# Patient Record
Sex: MALE | Race: WHITE | NOT HISPANIC OR LATINO
[De-identification: names, ages, dates, MRNs, and addresses within clinical notes are randomized per-mention and may not be internally consistent; named-entity substitution may affect disease eponyms.]

---

## 2017-01-09 ENCOUNTER — MEDICATION RENEWAL (OUTPATIENT)
Age: 62
End: 2017-01-09

## 2017-01-31 ENCOUNTER — APPOINTMENT (OUTPATIENT)
Dept: FAMILY MEDICINE | Facility: CLINIC | Age: 62
End: 2017-01-31

## 2017-01-31 VITALS
DIASTOLIC BLOOD PRESSURE: 80 MMHG | TEMPERATURE: 98.6 F | HEART RATE: 74 BPM | SYSTOLIC BLOOD PRESSURE: 130 MMHG | RESPIRATION RATE: 18 BRPM

## 2017-01-31 DIAGNOSIS — G47.30 SLEEP APNEA, UNSPECIFIED: ICD-10-CM

## 2017-03-06 ENCOUNTER — MEDICATION RENEWAL (OUTPATIENT)
Age: 62
End: 2017-03-06

## 2017-04-25 ENCOUNTER — APPOINTMENT (OUTPATIENT)
Dept: FAMILY MEDICINE | Facility: CLINIC | Age: 62
End: 2017-04-25

## 2017-04-25 VITALS
SYSTOLIC BLOOD PRESSURE: 132 MMHG | HEART RATE: 74 BPM | DIASTOLIC BLOOD PRESSURE: 78 MMHG | TEMPERATURE: 98.4 F | RESPIRATION RATE: 18 BRPM

## 2017-04-30 LAB
ALBUMIN SERPL ELPH-MCNC: 4.8 G/DL
ALP BLD-CCNC: 78 U/L
ALT SERPL-CCNC: 27 U/L
ANION GAP SERPL CALC-SCNC: 21 MMOL/L
AST SERPL-CCNC: 24 U/L
BASOPHILS # BLD AUTO: 0.02 K/UL
BASOPHILS NFR BLD AUTO: 0.4 %
BILIRUB SERPL-MCNC: 0.5 MG/DL
BUN SERPL-MCNC: 13 MG/DL
CALCIUM SERPL-MCNC: 9.1 MG/DL
CHLORIDE SERPL-SCNC: 100 MMOL/L
CHOLEST SERPL-MCNC: 177 MG/DL
CHOLEST/HDLC SERPL: 6.3 RATIO
CO2 SERPL-SCNC: 17 MMOL/L
CREAT SERPL-MCNC: 0.72 MG/DL
EOSINOPHIL # BLD AUTO: 0.15 K/UL
EOSINOPHIL NFR BLD AUTO: 2.8 %
GLUCOSE SERPL-MCNC: 94 MG/DL
HCT VFR BLD CALC: 44.2 %
HDLC SERPL-MCNC: 28 MG/DL
HGB BLD-MCNC: 14.9 G/DL
IMM GRANULOCYTES NFR BLD AUTO: 0.2 %
LDLC SERPL CALC-MCNC: 123 MG/DL
LYMPHOCYTES # BLD AUTO: 2.17 K/UL
LYMPHOCYTES NFR BLD AUTO: 40.2 %
MAN DIFF?: NORMAL
MCHC RBC-ENTMCNC: 30.4 PG
MCHC RBC-ENTMCNC: 33.7 GM/DL
MCV RBC AUTO: 90.2 FL
MONOCYTES # BLD AUTO: 0.61 K/UL
MONOCYTES NFR BLD AUTO: 11.3 %
NEUTROPHILS # BLD AUTO: 2.44 K/UL
NEUTROPHILS NFR BLD AUTO: 45.1 %
PLATELET # BLD AUTO: 249 K/UL
POTASSIUM SERPL-SCNC: 4.5 MMOL/L
PROT SERPL-MCNC: 7.1 G/DL
PSA SERPL-MCNC: 2.46 NG/ML
RBC # BLD: 4.9 M/UL
RBC # FLD: 12.8 %
SODIUM SERPL-SCNC: 138 MMOL/L
TRIGL SERPL-MCNC: 129 MG/DL
TSH SERPL-ACNC: 2.07 UIU/ML
WBC # FLD AUTO: 5.4 K/UL

## 2017-07-05 ENCOUNTER — RX RENEWAL (OUTPATIENT)
Age: 62
End: 2017-07-05

## 2017-07-31 ENCOUNTER — RX RENEWAL (OUTPATIENT)
Age: 62
End: 2017-07-31

## 2017-08-17 ENCOUNTER — APPOINTMENT (OUTPATIENT)
Dept: FAMILY MEDICINE | Facility: CLINIC | Age: 62
End: 2017-08-17
Payer: MEDICAID

## 2017-08-17 VITALS
RESPIRATION RATE: 20 BRPM | HEART RATE: 76 BPM | DIASTOLIC BLOOD PRESSURE: 80 MMHG | BODY MASS INDEX: 28.49 KG/M2 | SYSTOLIC BLOOD PRESSURE: 126 MMHG | WEIGHT: 199 LBS | HEIGHT: 70 IN

## 2017-08-17 PROCEDURE — 99214 OFFICE O/P EST MOD 30 MIN: CPT | Mod: 25

## 2017-08-17 PROCEDURE — 36415 COLL VENOUS BLD VENIPUNCTURE: CPT

## 2017-08-20 LAB
ALBUMIN SERPL ELPH-MCNC: 4.9 G/DL
ALP BLD-CCNC: 78 U/L
ALT SERPL-CCNC: 27 U/L
ANION GAP SERPL CALC-SCNC: 17 MMOL/L
AST SERPL-CCNC: 20 U/L
BASOPHILS # BLD AUTO: 0.01 K/UL
BASOPHILS NFR BLD AUTO: 0.2 %
BILIRUB SERPL-MCNC: 0.5 MG/DL
BUN SERPL-MCNC: 15 MG/DL
CALCIUM SERPL-MCNC: 9.3 MG/DL
CHLORIDE SERPL-SCNC: 102 MMOL/L
CHOLEST SERPL-MCNC: 175 MG/DL
CHOLEST/HDLC SERPL: 6.5 RATIO
CO2 SERPL-SCNC: 21 MMOL/L
CREAT SERPL-MCNC: 0.76 MG/DL
EOSINOPHIL # BLD AUTO: 0.15 K/UL
EOSINOPHIL NFR BLD AUTO: 2.7 %
GLUCOSE SERPL-MCNC: 96 MG/DL
HBA1C MFR BLD HPLC: 5.6 %
HCT VFR BLD CALC: 44.8 %
HDLC SERPL-MCNC: 27 MG/DL
HGB BLD-MCNC: 14.9 G/DL
IMM GRANULOCYTES NFR BLD AUTO: 0.2 %
LDLC SERPL CALC-MCNC: 113 MG/DL
LYMPHOCYTES # BLD AUTO: 2.29 K/UL
LYMPHOCYTES NFR BLD AUTO: 40.9 %
MAN DIFF?: NORMAL
MCHC RBC-ENTMCNC: 30.6 PG
MCHC RBC-ENTMCNC: 33.3 GM/DL
MCV RBC AUTO: 92 FL
MONOCYTES # BLD AUTO: 0.59 K/UL
MONOCYTES NFR BLD AUTO: 10.5 %
NEUTROPHILS # BLD AUTO: 2.55 K/UL
NEUTROPHILS NFR BLD AUTO: 45.5 %
PLATELET # BLD AUTO: 227 K/UL
POTASSIUM SERPL-SCNC: 4.7 MMOL/L
PROT SERPL-MCNC: 7.3 G/DL
RBC # BLD: 4.87 M/UL
RBC # FLD: 12.8 %
SODIUM SERPL-SCNC: 140 MMOL/L
T4 FREE SERPL-MCNC: 1.3 NG/DL
TRIGL SERPL-MCNC: 173 MG/DL
TSH SERPL-ACNC: 1.95 UIU/ML
WBC # FLD AUTO: 5.6 K/UL

## 2017-08-28 ENCOUNTER — RX RENEWAL (OUTPATIENT)
Age: 62
End: 2017-08-28

## 2017-09-12 ENCOUNTER — MEDICATION RENEWAL (OUTPATIENT)
Age: 62
End: 2017-09-12

## 2017-09-12 RX ORDER — OMEPRAZOLE 20 MG/1
20 CAPSULE, DELAYED RELEASE ORAL DAILY
Qty: 30 | Refills: 3 | Status: COMPLETED | COMMUNITY
Start: 2017-09-12 | End: 2017-09-12

## 2017-09-14 ENCOUNTER — RX RENEWAL (OUTPATIENT)
Age: 62
End: 2017-09-14

## 2017-09-28 ENCOUNTER — RX RENEWAL (OUTPATIENT)
Age: 62
End: 2017-09-28

## 2017-10-10 ENCOUNTER — RX RENEWAL (OUTPATIENT)
Age: 62
End: 2017-10-10

## 2017-10-20 ENCOUNTER — APPOINTMENT (OUTPATIENT)
Dept: FAMILY MEDICINE | Facility: CLINIC | Age: 62
End: 2017-10-20
Payer: MEDICAID

## 2017-10-20 VITALS
BODY MASS INDEX: 28.63 KG/M2 | HEART RATE: 76 BPM | HEIGHT: 70 IN | RESPIRATION RATE: 20 BRPM | WEIGHT: 200 LBS | DIASTOLIC BLOOD PRESSURE: 70 MMHG | SYSTOLIC BLOOD PRESSURE: 125 MMHG

## 2017-10-20 PROCEDURE — 99213 OFFICE O/P EST LOW 20 MIN: CPT

## 2017-11-13 ENCOUNTER — RX RENEWAL (OUTPATIENT)
Age: 62
End: 2017-11-13

## 2017-11-24 ENCOUNTER — APPOINTMENT (OUTPATIENT)
Dept: FAMILY MEDICINE | Facility: CLINIC | Age: 62
End: 2017-11-24

## 2017-11-27 ENCOUNTER — RX RENEWAL (OUTPATIENT)
Age: 62
End: 2017-11-27

## 2017-11-30 ENCOUNTER — APPOINTMENT (OUTPATIENT)
Dept: FAMILY MEDICINE | Facility: CLINIC | Age: 62
End: 2017-11-30

## 2017-12-07 ENCOUNTER — APPOINTMENT (OUTPATIENT)
Dept: FAMILY MEDICINE | Facility: CLINIC | Age: 62
End: 2017-12-07
Payer: MEDICAID

## 2017-12-07 ENCOUNTER — LABORATORY RESULT (OUTPATIENT)
Age: 62
End: 2017-12-07

## 2017-12-07 VITALS
HEIGHT: 70 IN | DIASTOLIC BLOOD PRESSURE: 70 MMHG | WEIGHT: 204 LBS | BODY MASS INDEX: 29.2 KG/M2 | SYSTOLIC BLOOD PRESSURE: 124 MMHG | RESPIRATION RATE: 20 BRPM | HEART RATE: 78 BPM

## 2017-12-07 PROCEDURE — 99213 OFFICE O/P EST LOW 20 MIN: CPT | Mod: 25

## 2017-12-07 PROCEDURE — 36415 COLL VENOUS BLD VENIPUNCTURE: CPT

## 2017-12-20 LAB
ALBUMIN SERPL ELPH-MCNC: 4.9 G/DL
ALP BLD-CCNC: 78 U/L
ALT SERPL-CCNC: 31 U/L
ANION GAP SERPL CALC-SCNC: 15 MMOL/L
AST SERPL-CCNC: 22 U/L
BASOPHILS # BLD AUTO: 0.02 K/UL
BASOPHILS NFR BLD AUTO: 0.4 %
BILIRUB SERPL-MCNC: 0.5 MG/DL
BUN SERPL-MCNC: 12 MG/DL
CALCIUM SERPL-MCNC: 9.1 MG/DL
CHLORIDE SERPL-SCNC: 100 MMOL/L
CHOLEST SERPL-MCNC: 174 MG/DL
CHOLEST/HDLC SERPL: 6.4 RATIO
CO2 SERPL-SCNC: 24 MMOL/L
CREAT SERPL-MCNC: 0.72 MG/DL
EOSINOPHIL # BLD AUTO: 0.15 K/UL
EOSINOPHIL NFR BLD AUTO: 2.7 %
GLUCOSE SERPL-MCNC: 92 MG/DL
HBA1C MFR BLD HPLC: 5.4 %
HCT VFR BLD CALC: 44.3 %
HCV AB SER QL: ABNORMAL
HCV S/CO RATIO: 1.79 S/CO
HDLC SERPL-MCNC: 27 MG/DL
HGB BLD-MCNC: 15.2 G/DL
IMM GRANULOCYTES NFR BLD AUTO: 0.2 %
LDLC SERPL CALC-MCNC: 116 MG/DL
LYMPHOCYTES # BLD AUTO: 2.44 K/UL
LYMPHOCYTES NFR BLD AUTO: 43.4 %
MAN DIFF?: NORMAL
MCHC RBC-ENTMCNC: 30.8 PG
MCHC RBC-ENTMCNC: 34.3 GM/DL
MCV RBC AUTO: 89.9 FL
MONOCYTES # BLD AUTO: 0.55 K/UL
MONOCYTES NFR BLD AUTO: 9.8 %
NEUTROPHILS # BLD AUTO: 2.45 K/UL
NEUTROPHILS NFR BLD AUTO: 43.5 %
PLATELET # BLD AUTO: 242 K/UL
POTASSIUM SERPL-SCNC: 4.5 MMOL/L
PROT SERPL-MCNC: 7.5 G/DL
RBC # BLD: 4.93 M/UL
RBC # FLD: 12.7 %
SODIUM SERPL-SCNC: 139 MMOL/L
TRIGL SERPL-MCNC: 155 MG/DL
WBC # FLD AUTO: 5.62 K/UL

## 2017-12-29 ENCOUNTER — RX RENEWAL (OUTPATIENT)
Age: 62
End: 2017-12-29

## 2018-01-02 ENCOUNTER — RX RENEWAL (OUTPATIENT)
Age: 63
End: 2018-01-02

## 2018-01-29 ENCOUNTER — RX RENEWAL (OUTPATIENT)
Age: 63
End: 2018-01-29

## 2018-02-06 ENCOUNTER — APPOINTMENT (OUTPATIENT)
Dept: FAMILY MEDICINE | Facility: CLINIC | Age: 63
End: 2018-02-06
Payer: MEDICARE

## 2018-02-06 VITALS
DIASTOLIC BLOOD PRESSURE: 78 MMHG | BODY MASS INDEX: 28.92 KG/M2 | HEART RATE: 76 BPM | HEIGHT: 70 IN | WEIGHT: 202 LBS | SYSTOLIC BLOOD PRESSURE: 126 MMHG | RESPIRATION RATE: 20 BRPM

## 2018-02-06 PROCEDURE — 36415 COLL VENOUS BLD VENIPUNCTURE: CPT

## 2018-02-06 PROCEDURE — 99214 OFFICE O/P EST MOD 30 MIN: CPT | Mod: 25

## 2018-02-07 LAB
ALBUMIN SERPL ELPH-MCNC: 4.9 G/DL
ALP BLD-CCNC: 76 U/L
ALT SERPL-CCNC: 37 U/L
ANION GAP SERPL CALC-SCNC: 13 MMOL/L
AST SERPL-CCNC: 28 U/L
BASOPHILS # BLD AUTO: 0.01 K/UL
BASOPHILS NFR BLD AUTO: 0.2 %
BILIRUB SERPL-MCNC: 0.5 MG/DL
BUN SERPL-MCNC: 14 MG/DL
CALCIUM SERPL-MCNC: 9.4 MG/DL
CHLORIDE SERPL-SCNC: 101 MMOL/L
CHOLEST SERPL-MCNC: 175 MG/DL
CHOLEST/HDLC SERPL: 7.3 RATIO
CO2 SERPL-SCNC: 26 MMOL/L
CREAT SERPL-MCNC: 0.86 MG/DL
EOSINOPHIL # BLD AUTO: 0.13 K/UL
EOSINOPHIL NFR BLD AUTO: 2.5 %
GLUCOSE SERPL-MCNC: 105 MG/DL
HBA1C MFR BLD HPLC: 5.4 %
HCT VFR BLD CALC: 44.3 %
HDLC SERPL-MCNC: 24 MG/DL
HGB BLD-MCNC: 14.6 G/DL
IMM GRANULOCYTES NFR BLD AUTO: 0 %
LDLC SERPL CALC-MCNC: 118 MG/DL
LYMPHOCYTES # BLD AUTO: 2.38 K/UL
LYMPHOCYTES NFR BLD AUTO: 45.9 %
MAN DIFF?: NORMAL
MCHC RBC-ENTMCNC: 30.2 PG
MCHC RBC-ENTMCNC: 33 GM/DL
MCV RBC AUTO: 91.5 FL
MONOCYTES # BLD AUTO: 0.47 K/UL
MONOCYTES NFR BLD AUTO: 9.1 %
NEUTROPHILS # BLD AUTO: 2.2 K/UL
NEUTROPHILS NFR BLD AUTO: 42.3 %
PLATELET # BLD AUTO: 209 K/UL
POTASSIUM SERPL-SCNC: 4.9 MMOL/L
PROT SERPL-MCNC: 7.5 G/DL
RBC # BLD: 4.84 M/UL
RBC # FLD: 12.5 %
SODIUM SERPL-SCNC: 140 MMOL/L
TRIGL SERPL-MCNC: 167 MG/DL
WBC # FLD AUTO: 5.19 K/UL

## 2018-02-26 ENCOUNTER — RX RENEWAL (OUTPATIENT)
Age: 63
End: 2018-02-26

## 2018-03-05 ENCOUNTER — RX RENEWAL (OUTPATIENT)
Age: 63
End: 2018-03-05

## 2018-03-23 ENCOUNTER — RX RENEWAL (OUTPATIENT)
Age: 63
End: 2018-03-23

## 2018-04-02 ENCOUNTER — RX RENEWAL (OUTPATIENT)
Age: 63
End: 2018-04-02

## 2018-05-01 ENCOUNTER — APPOINTMENT (OUTPATIENT)
Dept: FAMILY MEDICINE | Facility: CLINIC | Age: 63
End: 2018-05-01
Payer: MEDICARE

## 2018-05-01 VITALS
WEIGHT: 199 LBS | SYSTOLIC BLOOD PRESSURE: 126 MMHG | BODY MASS INDEX: 28.49 KG/M2 | DIASTOLIC BLOOD PRESSURE: 78 MMHG | HEART RATE: 76 BPM | RESPIRATION RATE: 20 BRPM | HEIGHT: 70 IN

## 2018-05-01 DIAGNOSIS — N40.0 BENIGN PROSTATIC HYPERPLASIA WITHOUT LOWER URINARY TRACT SYMPMS: ICD-10-CM

## 2018-05-01 PROCEDURE — 99406 BEHAV CHNG SMOKING 3-10 MIN: CPT

## 2018-05-01 PROCEDURE — 99214 OFFICE O/P EST MOD 30 MIN: CPT | Mod: 25

## 2018-05-01 PROCEDURE — 36415 COLL VENOUS BLD VENIPUNCTURE: CPT

## 2018-05-03 LAB
ALBUMIN SERPL ELPH-MCNC: 5 G/DL
ALP BLD-CCNC: 84 U/L
ALT SERPL-CCNC: 32 U/L
ANION GAP SERPL CALC-SCNC: 25 MMOL/L
AST SERPL-CCNC: 28 U/L
BASOPHILS # BLD AUTO: 0.01 K/UL
BASOPHILS NFR BLD AUTO: 0.2 %
BILIRUB SERPL-MCNC: 0.5 MG/DL
BUN SERPL-MCNC: 12 MG/DL
CALCIUM SERPL-MCNC: 9.9 MG/DL
CHLORIDE SERPL-SCNC: 105 MMOL/L
CHOLEST SERPL-MCNC: 161 MG/DL
CHOLEST/HDLC SERPL: 4.6 RATIO
CO2 SERPL-SCNC: 19 MMOL/L
CREAT SERPL-MCNC: 0.84 MG/DL
EOSINOPHIL # BLD AUTO: 0.15 K/UL
EOSINOPHIL NFR BLD AUTO: 2.6 %
GLUCOSE SERPL-MCNC: 95 MG/DL
HBA1C MFR BLD HPLC: 5.4 %
HCT VFR BLD CALC: 43.5 %
HDLC SERPL-MCNC: 35 MG/DL
HGB BLD-MCNC: 14.6 G/DL
IMM GRANULOCYTES NFR BLD AUTO: 0.2 %
LDLC SERPL CALC-MCNC: 100 MG/DL
LYMPHOCYTES # BLD AUTO: 2.34 K/UL
LYMPHOCYTES NFR BLD AUTO: 40.2 %
MAN DIFF?: NORMAL
MCHC RBC-ENTMCNC: 31.4 PG
MCHC RBC-ENTMCNC: 33.6 GM/DL
MCV RBC AUTO: 93.5 FL
MONOCYTES # BLD AUTO: 0.6 K/UL
MONOCYTES NFR BLD AUTO: 10.3 %
NEUTROPHILS # BLD AUTO: 2.71 K/UL
NEUTROPHILS NFR BLD AUTO: 46.5 %
PLATELET # BLD AUTO: 239 K/UL
POTASSIUM SERPL-SCNC: 5.2 MMOL/L
PROT SERPL-MCNC: 7.6 G/DL
PSA FREE FLD-MCNC: 19.5
PSA FREE SERPL-MCNC: 0.61 NG/ML
PSA SERPL-MCNC: 3.13 NG/ML
RBC # BLD: 4.65 M/UL
RBC # FLD: 13.3 %
SODIUM SERPL-SCNC: 149 MMOL/L
TRIGL SERPL-MCNC: 132 MG/DL
WBC # FLD AUTO: 5.82 K/UL

## 2018-06-01 ENCOUNTER — RX RENEWAL (OUTPATIENT)
Age: 63
End: 2018-06-01

## 2018-06-29 ENCOUNTER — RX RENEWAL (OUTPATIENT)
Age: 63
End: 2018-06-29

## 2018-07-24 ENCOUNTER — RX RENEWAL (OUTPATIENT)
Age: 63
End: 2018-07-24

## 2018-07-27 ENCOUNTER — RX RENEWAL (OUTPATIENT)
Age: 63
End: 2018-07-27

## 2018-07-31 ENCOUNTER — APPOINTMENT (OUTPATIENT)
Dept: FAMILY MEDICINE | Facility: CLINIC | Age: 63
End: 2018-07-31
Payer: MEDICARE

## 2018-07-31 VITALS
DIASTOLIC BLOOD PRESSURE: 70 MMHG | SYSTOLIC BLOOD PRESSURE: 126 MMHG | BODY MASS INDEX: 26.63 KG/M2 | WEIGHT: 186 LBS | HEIGHT: 70 IN | RESPIRATION RATE: 20 BRPM | HEART RATE: 76 BPM

## 2018-07-31 DIAGNOSIS — S20.219A CONTUSION OF UNSPECIFIED FRONT WALL OF THORAX, INITIAL ENCOUNTER: ICD-10-CM

## 2018-07-31 DIAGNOSIS — M19.90 UNSPECIFIED OSTEOARTHRITIS, UNSPECIFIED SITE: ICD-10-CM

## 2018-07-31 PROCEDURE — 36415 COLL VENOUS BLD VENIPUNCTURE: CPT

## 2018-07-31 PROCEDURE — 99214 OFFICE O/P EST MOD 30 MIN: CPT | Mod: 25

## 2018-07-31 NOTE — PHYSICAL EXAM
[No Acute Distress] : no acute distress [Supple] : supple [No Respiratory Distress] : no respiratory distress  [Clear to Auscultation] : lungs were clear to auscultation bilaterally [No Accessory Muscle Use] : no accessory muscle use [Normal Rate] : normal rate  [Regular Rhythm] : with a regular rhythm [Normal S1, S2] : normal S1 and S2 [No Murmur] : no murmur heard [No Edema] : there was no peripheral edema [Soft] : abdomen soft [Non Tender] : non-tender [Non-distended] : non-distended [No Masses] : no abdominal mass palpated [No HSM] : no HSM [Normal Bowel Sounds] : normal bowel sounds [No Joint Swelling] : no joint swelling [Grossly Normal Strength/Tone] : grossly normal strength/tone [No Focal Deficits] : no focal deficits [Alert and Oriented x3] : oriented to person, place, and time [de-identified] : very mild chest wall tenderness on L with no creps noted

## 2018-07-31 NOTE — HISTORY OF PRESENT ILLNESS
[de-identified] : Presents for BP check, labs, and general follow-up for multiple medical issues.  Note tripped recently striking chest - does complain of some residual rib pain.  Following with Urology - note recently started on Tamsulosin; also following with Psych for anxiety/depression - in process of adjusting meds.  Reviewed all medication with pt - using  medication for pain and insomnia very appropriately to maintain daily activities and QOL - pt is alert, conversant, with no evidence of intoxication or euphoria.

## 2018-07-31 NOTE — ASSESSMENT
[FreeTextEntry1] : Stable exam; acceptable BP; prob chest contusion with no clinical evidence of fracture or resp compromise\par All chronic issues well managed with current medication regime with no obvious adverse effects\par Lab profiles sent

## 2018-08-01 LAB
ALBUMIN SERPL ELPH-MCNC: 4.6 G/DL
ALP BLD-CCNC: 87 U/L
ALT SERPL-CCNC: 24 U/L
ANION GAP SERPL CALC-SCNC: 12 MMOL/L
AST SERPL-CCNC: 23 U/L
BASOPHILS # BLD AUTO: 0.02 K/UL
BASOPHILS NFR BLD AUTO: 0.4 %
BILIRUB SERPL-MCNC: 0.3 MG/DL
BUN SERPL-MCNC: 13 MG/DL
CALCIUM SERPL-MCNC: 8.9 MG/DL
CHLORIDE SERPL-SCNC: 102 MMOL/L
CHOLEST SERPL-MCNC: 143 MG/DL
CHOLEST/HDLC SERPL: 4.6 RATIO
CO2 SERPL-SCNC: 24 MMOL/L
CREAT SERPL-MCNC: 0.71 MG/DL
EOSINOPHIL # BLD AUTO: 0.21 K/UL
EOSINOPHIL NFR BLD AUTO: 3.8 %
GLUCOSE SERPL-MCNC: 88 MG/DL
HBA1C MFR BLD HPLC: 5.5 %
HCT VFR BLD CALC: 43.9 %
HDLC SERPL-MCNC: 31 MG/DL
HGB BLD-MCNC: 14.9 G/DL
IMM GRANULOCYTES NFR BLD AUTO: 0 %
LDLC SERPL CALC-MCNC: 82 MG/DL
LYMPHOCYTES # BLD AUTO: 2.06 K/UL
LYMPHOCYTES NFR BLD AUTO: 36.9 %
MAN DIFF?: NORMAL
MCHC RBC-ENTMCNC: 31.8 PG
MCHC RBC-ENTMCNC: 33.9 GM/DL
MCV RBC AUTO: 93.6 FL
MONOCYTES # BLD AUTO: 0.49 K/UL
MONOCYTES NFR BLD AUTO: 8.8 %
NEUTROPHILS # BLD AUTO: 2.81 K/UL
NEUTROPHILS NFR BLD AUTO: 50.1 %
PLATELET # BLD AUTO: 308 K/UL
POTASSIUM SERPL-SCNC: 4.6 MMOL/L
PROT SERPL-MCNC: 6.6 G/DL
RBC # BLD: 4.69 M/UL
RBC # FLD: 12.4 %
SODIUM SERPL-SCNC: 138 MMOL/L
TRIGL SERPL-MCNC: 151 MG/DL
WBC # FLD AUTO: 5.59 K/UL

## 2018-08-29 ENCOUNTER — RX RENEWAL (OUTPATIENT)
Age: 63
End: 2018-08-29

## 2018-09-28 ENCOUNTER — RX RENEWAL (OUTPATIENT)
Age: 63
End: 2018-09-28

## 2018-10-22 ENCOUNTER — RX RENEWAL (OUTPATIENT)
Age: 63
End: 2018-10-22

## 2018-10-30 ENCOUNTER — APPOINTMENT (OUTPATIENT)
Dept: FAMILY MEDICINE | Facility: CLINIC | Age: 63
End: 2018-10-30
Payer: MEDICARE

## 2018-10-30 VITALS
HEIGHT: 70 IN | RESPIRATION RATE: 20 BRPM | BODY MASS INDEX: 27.2 KG/M2 | HEART RATE: 76 BPM | SYSTOLIC BLOOD PRESSURE: 126 MMHG | WEIGHT: 190 LBS | DIASTOLIC BLOOD PRESSURE: 75 MMHG

## 2018-10-30 PROCEDURE — 99214 OFFICE O/P EST MOD 30 MIN: CPT | Mod: 25

## 2018-10-30 PROCEDURE — 36415 COLL VENOUS BLD VENIPUNCTURE: CPT

## 2018-10-30 RX ORDER — TAMSULOSIN HYDROCHLORIDE 0.4 MG/1
0.4 CAPSULE ORAL
Qty: 60 | Refills: 5 | Status: ACTIVE | COMMUNITY
Start: 2018-10-30

## 2018-10-30 NOTE — ASSESSMENT
[FreeTextEntry1] : Stable exam with acceptable BP\par Findings otherwise consistent with history\par No gross findings R foot - advised to observe and F/U with Podiatry\par All chronic issues appear well managed on current medication regime\par Lab profiles sent

## 2018-10-30 NOTE — HISTORY OF PRESENT ILLNESS
[de-identified] : Presents for BP check, labs, and general follow-up.  Note seeing therapist (Dr. Palma) - reviewed medication - states wife feels it is helping; also has seen Dr. Schaffer - now on Tamsulosin.  States having increased pain lower back but states present medication regime enables good functioning at home and at work - note pt is alert, conversant, with no evidence of intoxication or euphoria.  Does states tripped while walking by the pool and noted "lump" at the bottom of R foot.

## 2018-10-30 NOTE — PHYSICAL EXAM
[No Acute Distress] : no acute distress [Supple] : supple [No Respiratory Distress] : no respiratory distress  [Clear to Auscultation] : lungs were clear to auscultation bilaterally [No Accessory Muscle Use] : no accessory muscle use [Normal Rate] : normal rate  [Regular Rhythm] : with a regular rhythm [Normal S1, S2] : normal S1 and S2 [No Murmur] : no murmur heard [No Edema] : there was no peripheral edema [Soft] : abdomen soft [Non Tender] : non-tender [Muscle Spasms, Bilateral] : bilateral muscle spasms [No Joint Swelling] : no joint swelling [No Focal Deficits] : no focal deficits [Alert and Oriented x3] : oriented to person, place, and time [de-identified] : "fullness" noted bottom of R foot without definitive mass

## 2018-10-31 LAB
ALBUMIN SERPL ELPH-MCNC: 4.9 G/DL
ALP BLD-CCNC: 91 U/L
ALT SERPL-CCNC: 25 U/L
ANION GAP SERPL CALC-SCNC: 16 MMOL/L
AST SERPL-CCNC: 22 U/L
BASOPHILS # BLD AUTO: 0.02 K/UL
BASOPHILS NFR BLD AUTO: 0.4 %
BILIRUB SERPL-MCNC: 0.5 MG/DL
BUN SERPL-MCNC: 10 MG/DL
CALCIUM SERPL-MCNC: 9.2 MG/DL
CHLORIDE SERPL-SCNC: 98 MMOL/L
CHOLEST SERPL-MCNC: 167 MG/DL
CHOLEST/HDLC SERPL: 5.2 RATIO
CO2 SERPL-SCNC: 23 MMOL/L
CREAT SERPL-MCNC: 0.79 MG/DL
EOSINOPHIL # BLD AUTO: 0.14 K/UL
EOSINOPHIL NFR BLD AUTO: 2.7 %
GLUCOSE SERPL-MCNC: 93 MG/DL
HBA1C MFR BLD HPLC: 5.5 %
HCT VFR BLD CALC: 45.4 %
HDLC SERPL-MCNC: 32 MG/DL
HGB BLD-MCNC: 15.2 G/DL
IMM GRANULOCYTES NFR BLD AUTO: 0 %
LDLC SERPL CALC-MCNC: 92 MG/DL
LYMPHOCYTES # BLD AUTO: 1.85 K/UL
LYMPHOCYTES NFR BLD AUTO: 36.1 %
MAN DIFF?: NORMAL
MCHC RBC-ENTMCNC: 31.5 PG
MCHC RBC-ENTMCNC: 33.5 GM/DL
MCV RBC AUTO: 94.2 FL
MONOCYTES # BLD AUTO: 0.5 K/UL
MONOCYTES NFR BLD AUTO: 9.7 %
NEUTROPHILS # BLD AUTO: 2.62 K/UL
NEUTROPHILS NFR BLD AUTO: 51.1 %
PLATELET # BLD AUTO: 262 K/UL
POTASSIUM SERPL-SCNC: 4.5 MMOL/L
PROT SERPL-MCNC: 7.1 G/DL
RBC # BLD: 4.82 M/UL
RBC # FLD: 12.8 %
SODIUM SERPL-SCNC: 137 MMOL/L
TRIGL SERPL-MCNC: 214 MG/DL
WBC # FLD AUTO: 5.13 K/UL

## 2018-11-16 ENCOUNTER — RX RENEWAL (OUTPATIENT)
Age: 63
End: 2018-11-16

## 2018-11-27 ENCOUNTER — RX RENEWAL (OUTPATIENT)
Age: 63
End: 2018-11-27

## 2018-12-28 ENCOUNTER — RX RENEWAL (OUTPATIENT)
Age: 63
End: 2018-12-28

## 2019-01-25 ENCOUNTER — APPOINTMENT (OUTPATIENT)
Dept: FAMILY MEDICINE | Facility: CLINIC | Age: 64
End: 2019-01-25
Payer: MEDICARE

## 2019-01-25 VITALS
DIASTOLIC BLOOD PRESSURE: 78 MMHG | SYSTOLIC BLOOD PRESSURE: 126 MMHG | HEART RATE: 78 BPM | HEIGHT: 70 IN | RESPIRATION RATE: 20 BRPM | WEIGHT: 199 LBS | BODY MASS INDEX: 28.49 KG/M2

## 2019-01-25 DIAGNOSIS — M54.9 DORSALGIA, UNSPECIFIED: ICD-10-CM

## 2019-01-25 PROCEDURE — 36415 COLL VENOUS BLD VENIPUNCTURE: CPT

## 2019-01-25 PROCEDURE — 99214 OFFICE O/P EST MOD 30 MIN: CPT | Mod: 25

## 2019-01-25 NOTE — ASSESSMENT
[FreeTextEntry1] : Hemodynamically stable with acceptable BP\par Musculoskeletal findings consistent with history\par Chronic issues well managed with current regime\par Lab profiles sent

## 2019-01-25 NOTE — PHYSICAL EXAM
[No Acute Distress] : no acute distress [Normal Outer Ear/Nose] : the outer ears and nose were normal in appearance [Normal Oropharynx] : the oropharynx was normal [Normal TMs] : both tympanic membranes were normal [Normal Nasal Mucosa] : the nasal mucosa was normal [Supple] : supple [No Respiratory Distress] : no respiratory distress  [Clear to Auscultation] : lungs were clear to auscultation bilaterally [No Accessory Muscle Use] : no accessory muscle use [Normal Rate] : normal rate  [Regular Rhythm] : with a regular rhythm [Normal S1, S2] : normal S1 and S2 [No Murmur] : no murmur heard [No Edema] : there was no peripheral edema [Soft] : abdomen soft [Non Tender] : non-tender [Normal Posterior Cervical Nodes] : no posterior cervical lymphadenopathy [Normal Anterior Cervical Nodes] : no anterior cervical lymphadenopathy [Muscle Spasms, Bilateral] : bilateral muscle spasms [Normal Gait] : normal gait [Coordination Grossly Intact] : coordination grossly intact [No Focal Deficits] : no focal deficits [Alert and Oriented x3] : oriented to person, place, and time [de-identified] : mild congestion

## 2019-01-25 NOTE — HISTORY OF PRESENT ILLNESS
[de-identified] : Presents for BP check, labs, and general follow-up.  Reviewed all medication - using medication for pain and sleep very appropriately and judiciously to maintain daily activities and QOL with no obvious adverse effects.

## 2019-01-26 LAB
ALBUMIN SERPL ELPH-MCNC: 4.8 G/DL
ALP BLD-CCNC: 84 U/L
ALT SERPL-CCNC: 16 U/L
ANION GAP SERPL CALC-SCNC: 12 MMOL/L
AST SERPL-CCNC: 16 U/L
BASOPHILS # BLD AUTO: 0.03 K/UL
BASOPHILS NFR BLD AUTO: 0.5 %
BILIRUB SERPL-MCNC: 0.5 MG/DL
BUN SERPL-MCNC: 17 MG/DL
CALCIUM SERPL-MCNC: 9.1 MG/DL
CHLORIDE SERPL-SCNC: 102 MMOL/L
CHOLEST SERPL-MCNC: 168 MG/DL
CHOLEST/HDLC SERPL: 5.8 RATIO
CO2 SERPL-SCNC: 22 MMOL/L
CREAT SERPL-MCNC: 0.75 MG/DL
EOSINOPHIL # BLD AUTO: 0.43 K/UL
EOSINOPHIL NFR BLD AUTO: 6.6 %
GLUCOSE SERPL-MCNC: 99 MG/DL
HBA1C MFR BLD HPLC: 5.4 %
HCT VFR BLD CALC: 44.4 %
HDLC SERPL-MCNC: 29 MG/DL
HGB BLD-MCNC: 14.9 G/DL
IMM GRANULOCYTES NFR BLD AUTO: 0.2 %
LDLC SERPL CALC-MCNC: 102 MG/DL
LYMPHOCYTES # BLD AUTO: 2.27 K/UL
LYMPHOCYTES NFR BLD AUTO: 34.6 %
MAN DIFF?: NORMAL
MCHC RBC-ENTMCNC: 31.2 PG
MCHC RBC-ENTMCNC: 33.6 GM/DL
MCV RBC AUTO: 92.9 FL
MONOCYTES # BLD AUTO: 0.52 K/UL
MONOCYTES NFR BLD AUTO: 7.9 %
NEUTROPHILS # BLD AUTO: 3.3 K/UL
NEUTROPHILS NFR BLD AUTO: 50.2 %
PLATELET # BLD AUTO: 224 K/UL
POTASSIUM SERPL-SCNC: 4.3 MMOL/L
PROT SERPL-MCNC: 6.9 G/DL
RBC # BLD: 4.78 M/UL
RBC # FLD: 12.8 %
SODIUM SERPL-SCNC: 137 MMOL/L
TRIGL SERPL-MCNC: 185 MG/DL
WBC # FLD AUTO: 6.56 K/UL

## 2019-02-25 ENCOUNTER — RX RENEWAL (OUTPATIENT)
Age: 64
End: 2019-02-25

## 2019-03-25 ENCOUNTER — RX RENEWAL (OUTPATIENT)
Age: 64
End: 2019-03-25

## 2019-04-24 ENCOUNTER — RX RENEWAL (OUTPATIENT)
Age: 64
End: 2019-04-24

## 2019-05-21 ENCOUNTER — APPOINTMENT (OUTPATIENT)
Dept: FAMILY MEDICINE | Facility: CLINIC | Age: 64
End: 2019-05-21
Payer: MEDICARE

## 2019-05-21 VITALS
RESPIRATION RATE: 20 BRPM | DIASTOLIC BLOOD PRESSURE: 70 MMHG | HEIGHT: 70 IN | SYSTOLIC BLOOD PRESSURE: 128 MMHG | BODY MASS INDEX: 28.63 KG/M2 | HEART RATE: 76 BPM | WEIGHT: 200 LBS

## 2019-05-21 PROCEDURE — 36415 COLL VENOUS BLD VENIPUNCTURE: CPT

## 2019-05-21 PROCEDURE — 99214 OFFICE O/P EST MOD 30 MIN: CPT | Mod: 25

## 2019-05-21 NOTE — PHYSICAL EXAM
[No Acute Distress] : no acute distress [No JVD] : no jugular venous distention [Supple] : supple [No Lymphadenopathy] : no lymphadenopathy [Thyroid Normal, No Nodules] : the thyroid was normal and there were no nodules present [No Respiratory Distress] : no respiratory distress  [Clear to Auscultation] : lungs were clear to auscultation bilaterally [No Accessory Muscle Use] : no accessory muscle use [Normal Rate] : normal rate  [Regular Rhythm] : with a regular rhythm [Normal S1, S2] : normal S1 and S2 [No Murmur] : no murmur heard [No Edema] : there was no peripheral edema [Soft] : abdomen soft [Non Tender] : non-tender [Muscle Spasms, Bilateral] : bilateral muscle spasms [Motor Strength Lower Extremities Bilaterally] : there was weakness in both lower extremities [No Focal Deficits] : no focal deficits [Speech Grossly Normal] : speech grossly normal [Memory Grossly Normal] : memory grossly normal [Normal Affect] : the affect was normal [Alert and Oriented x3] : oriented to person, place, and time [Normal Mood] : the mood was normal [Normal Insight/Judgement] : insight and judgment were intact

## 2019-05-21 NOTE — ASSESSMENT
[FreeTextEntry1] : Hemodynamically stable with acceptable BP\par Neuromuscular findings consistent with history\par All chronic issues well managed with current regime\par Lab profiles sent

## 2019-05-21 NOTE — HISTORY OF PRESENT ILLNESS
[de-identified] : Presents for BP check, labs, and general follow-up.  Reviewed medication - using pain mgt and sleep aid judiciously and appropriately to maintain daily activities and QOL with no obvious adverse effects.

## 2019-05-22 LAB
ALBUMIN SERPL ELPH-MCNC: 4.5 G/DL
ALP BLD-CCNC: 91 U/L
ALT SERPL-CCNC: 23 U/L
ANION GAP SERPL CALC-SCNC: 12 MMOL/L
AST SERPL-CCNC: 18 U/L
BASOPHILS # BLD AUTO: 0.02 K/UL
BASOPHILS NFR BLD AUTO: 0.5 %
BILIRUB SERPL-MCNC: 0.3 MG/DL
BUN SERPL-MCNC: 14 MG/DL
CALCIUM SERPL-MCNC: 9 MG/DL
CHLORIDE SERPL-SCNC: 104 MMOL/L
CHOLEST SERPL-MCNC: 176 MG/DL
CHOLEST/HDLC SERPL: 6.5 RATIO
CO2 SERPL-SCNC: 22 MMOL/L
CREAT SERPL-MCNC: 0.66 MG/DL
EOSINOPHIL # BLD AUTO: 0.1 K/UL
EOSINOPHIL NFR BLD AUTO: 2.3 %
ESTIMATED AVERAGE GLUCOSE: 108 MG/DL
FOLATE SERPL-MCNC: >20 NG/ML
GLUCOSE SERPL-MCNC: 105 MG/DL
HBA1C MFR BLD HPLC: 5.4 %
HCT VFR BLD CALC: 44.6 %
HDLC SERPL-MCNC: 27 MG/DL
HGB BLD-MCNC: 14.6 G/DL
IMM GRANULOCYTES NFR BLD AUTO: 0.2 %
LDLC SERPL CALC-MCNC: 108 MG/DL
LYMPHOCYTES # BLD AUTO: 1.69 K/UL
LYMPHOCYTES NFR BLD AUTO: 38.7 %
MAN DIFF?: NORMAL
MCHC RBC-ENTMCNC: 31.5 PG
MCHC RBC-ENTMCNC: 32.7 GM/DL
MCV RBC AUTO: 96.3 FL
MONOCYTES # BLD AUTO: 0.43 K/UL
MONOCYTES NFR BLD AUTO: 9.8 %
NEUTROPHILS # BLD AUTO: 2.12 K/UL
NEUTROPHILS NFR BLD AUTO: 48.5 %
PLATELET # BLD AUTO: 227 K/UL
POTASSIUM SERPL-SCNC: 4.7 MMOL/L
PROT SERPL-MCNC: 6.8 G/DL
PSA FREE FLD-MCNC: 18 %
PSA FREE SERPL-MCNC: 0.58 NG/ML
PSA SERPL-MCNC: 3.29 NG/ML
RBC # BLD: 4.63 M/UL
RBC # FLD: 12.6 %
SODIUM SERPL-SCNC: 138 MMOL/L
TRIGL SERPL-MCNC: 207 MG/DL
VIT B12 SERPL-MCNC: 1835 PG/ML
WBC # FLD AUTO: 4.37 K/UL

## 2019-06-21 ENCOUNTER — RX RENEWAL (OUTPATIENT)
Age: 64
End: 2019-06-21

## 2019-07-24 ENCOUNTER — RX RENEWAL (OUTPATIENT)
Age: 64
End: 2019-07-24

## 2019-08-20 ENCOUNTER — RX RENEWAL (OUTPATIENT)
Age: 64
End: 2019-08-20

## 2019-08-22 ENCOUNTER — RX RENEWAL (OUTPATIENT)
Age: 64
End: 2019-08-22

## 2019-09-16 ENCOUNTER — APPOINTMENT (OUTPATIENT)
Dept: FAMILY MEDICINE | Facility: CLINIC | Age: 64
End: 2019-09-16
Payer: MEDICARE

## 2019-09-16 VITALS
HEART RATE: 76 BPM | DIASTOLIC BLOOD PRESSURE: 70 MMHG | BODY MASS INDEX: 28.77 KG/M2 | RESPIRATION RATE: 20 BRPM | HEIGHT: 70 IN | SYSTOLIC BLOOD PRESSURE: 126 MMHG | WEIGHT: 201 LBS

## 2019-09-16 PROCEDURE — 36415 COLL VENOUS BLD VENIPUNCTURE: CPT

## 2019-09-16 PROCEDURE — 99214 OFFICE O/P EST MOD 30 MIN: CPT | Mod: 25

## 2019-09-16 NOTE — ASSESSMENT
[FreeTextEntry1] : Hemodynamically stable with acceptable BP\par Findings otherwise consistent with history\par Lab profiles sent

## 2019-09-16 NOTE — HISTORY OF PRESENT ILLNESS
[de-identified] : Presents for BP check, labs, and general follow-up.  States having increasing back pain due to known spinal stenosis - states using present pain mgt very judiciously but noting that pain has increased; states affecting daily functioning.  Discussed trying low-dose Oxycontin and using Vicodin for breakthrough - reviewed the proper use and precautions.  Note pt is fully functional, alert, conversant, with no evidence of intoxication or euphoria.  Also using sleep aid very appropriately with no evidence of adverse effects.

## 2019-09-16 NOTE — PHYSICAL EXAM
[No Acute Distress] : no acute distress [Normal] : soft, non-tender, non-distended, no masses palpated, no HSM and normal bowel sounds [No Edema] : there was no peripheral edema [Muscle Spasms, Bilateral] : bilateral muscle spasms [Motor Strength Lower Extremities Bilaterally] : there was weakness in both lower extremities [No Focal Deficits] : no focal deficits [Speech Grossly Normal] : speech grossly normal [Memory Grossly Normal] : memory grossly normal [Normal Affect] : the affect was normal [Normal Mood] : the mood was normal [Alert and Oriented x3] : oriented to person, place, and time [Normal Insight/Judgement] : insight and judgment were intact

## 2019-09-17 LAB
ALBUMIN SERPL ELPH-MCNC: 4.8 G/DL
ALP BLD-CCNC: 85 U/L
ALT SERPL-CCNC: 23 U/L
ANION GAP SERPL CALC-SCNC: 11 MMOL/L
AST SERPL-CCNC: 20 U/L
BASOPHILS # BLD AUTO: 0.03 K/UL
BASOPHILS NFR BLD AUTO: 0.6 %
BILIRUB SERPL-MCNC: 0.5 MG/DL
BUN SERPL-MCNC: 18 MG/DL
CALCIUM SERPL-MCNC: 9 MG/DL
CHLORIDE SERPL-SCNC: 103 MMOL/L
CHOLEST SERPL-MCNC: 159 MG/DL
CHOLEST/HDLC SERPL: 5.3 RATIO
CO2 SERPL-SCNC: 24 MMOL/L
CREAT SERPL-MCNC: 0.74 MG/DL
EOSINOPHIL # BLD AUTO: 0.15 K/UL
EOSINOPHIL NFR BLD AUTO: 2.8 %
ESTIMATED AVERAGE GLUCOSE: 108 MG/DL
GLUCOSE SERPL-MCNC: 93 MG/DL
HBA1C MFR BLD HPLC: 5.4 %
HCT VFR BLD CALC: 44.5 %
HDLC SERPL-MCNC: 30 MG/DL
HGB BLD-MCNC: 14.5 G/DL
IMM GRANULOCYTES NFR BLD AUTO: 0.2 %
LDLC SERPL CALC-MCNC: 99 MG/DL
LYMPHOCYTES # BLD AUTO: 2.23 K/UL
LYMPHOCYTES NFR BLD AUTO: 41.1 %
MAN DIFF?: NORMAL
MCHC RBC-ENTMCNC: 31.7 PG
MCHC RBC-ENTMCNC: 32.6 GM/DL
MCV RBC AUTO: 97.4 FL
MONOCYTES # BLD AUTO: 0.61 K/UL
MONOCYTES NFR BLD AUTO: 11.2 %
NEUTROPHILS # BLD AUTO: 2.4 K/UL
NEUTROPHILS NFR BLD AUTO: 44.1 %
PLATELET # BLD AUTO: 220 K/UL
POTASSIUM SERPL-SCNC: 5 MMOL/L
PROT SERPL-MCNC: 7.2 G/DL
RBC # BLD: 4.57 M/UL
RBC # FLD: 12.5 %
SODIUM SERPL-SCNC: 138 MMOL/L
TRIGL SERPL-MCNC: 152 MG/DL
WBC # FLD AUTO: 5.43 K/UL

## 2019-10-18 ENCOUNTER — RX RENEWAL (OUTPATIENT)
Age: 64
End: 2019-10-18

## 2019-11-18 ENCOUNTER — RX RENEWAL (OUTPATIENT)
Age: 64
End: 2019-11-18

## 2019-11-29 ENCOUNTER — TRANSCRIPTION ENCOUNTER (OUTPATIENT)
Age: 64
End: 2019-11-29

## 2019-12-17 ENCOUNTER — RX RENEWAL (OUTPATIENT)
Age: 64
End: 2019-12-17

## 2019-12-18 ENCOUNTER — MEDICATION RENEWAL (OUTPATIENT)
Age: 64
End: 2019-12-18

## 2020-01-17 ENCOUNTER — APPOINTMENT (OUTPATIENT)
Dept: FAMILY MEDICINE | Facility: CLINIC | Age: 65
End: 2020-01-17
Payer: MEDICARE

## 2020-01-17 VITALS
SYSTOLIC BLOOD PRESSURE: 125 MMHG | DIASTOLIC BLOOD PRESSURE: 70 MMHG | HEART RATE: 76 BPM | RESPIRATION RATE: 20 BRPM | BODY MASS INDEX: 28.77 KG/M2 | WEIGHT: 201 LBS | HEIGHT: 70 IN

## 2020-01-17 DIAGNOSIS — M51.24 OTHER INTERVERTEBRAL DISC DISPLACEMENT, THORACIC REGION: ICD-10-CM

## 2020-01-17 PROCEDURE — 36415 COLL VENOUS BLD VENIPUNCTURE: CPT

## 2020-01-17 PROCEDURE — 99214 OFFICE O/P EST MOD 30 MIN: CPT | Mod: 25

## 2020-01-17 NOTE — HISTORY OF PRESENT ILLNESS
[de-identified] : Presents for BP check, labs, and general follow-up.  States Oxycontin has enabled him to somewhat reduce the use of Vicodin and enables good daily functioning and QOL.  Note pt is alert, conversant, with no evidence of intoxication or euphoria.  States has persistent congestion, but otherwise no new complaints.

## 2020-01-17 NOTE — ASSESSMENT
[FreeTextEntry1] : Hemodynamically stable with acceptable BP\par Musculoskeletal findings consistent with history - continue proper use of all medication\par Lab profiles drawn in office and sent

## 2020-01-17 NOTE — PHYSICAL EXAM
[No Acute Distress] : no acute distress [Normal] : normal rate, regular rhythm, normal S1 and S2 and no murmur heard [No Edema] : there was no peripheral edema [Soft] : abdomen soft [Non Tender] : non-tender [Muscle Spasms, Bilateral] : bilateral muscle spasms [Motor Strength Lower Extremities Bilaterally] : there was weakness in both lower extremities [No Focal Deficits] : no focal deficits [Speech Grossly Normal] : speech grossly normal [Normal Affect] : the affect was normal [Memory Grossly Normal] : memory grossly normal [Normal Mood] : the mood was normal [Alert and Oriented x3] : oriented to person, place, and time [Normal Insight/Judgement] : insight and judgment were intact [de-identified] : congestion without injection

## 2020-01-17 NOTE — REVIEW OF SYSTEMS
[Postnasal Drip] : postnasal drip [Back Pain] : back pain [Muscle Pain] : muscle pain [Negative] : Genitourinary

## 2020-01-18 LAB
ALBUMIN SERPL ELPH-MCNC: 4.5 G/DL
ALP BLD-CCNC: 80 U/L
ALT SERPL-CCNC: 23 U/L
ANION GAP SERPL CALC-SCNC: 11 MMOL/L
AST SERPL-CCNC: 21 U/L
BASOPHILS # BLD AUTO: 0.02 K/UL
BASOPHILS NFR BLD AUTO: 0.6 %
BILIRUB SERPL-MCNC: 0.2 MG/DL
BUN SERPL-MCNC: 15 MG/DL
CALCIUM SERPL-MCNC: 8.6 MG/DL
CHLORIDE SERPL-SCNC: 103 MMOL/L
CHOLEST SERPL-MCNC: 124 MG/DL
CHOLEST/HDLC SERPL: 5.5 RATIO
CO2 SERPL-SCNC: 24 MMOL/L
CREAT SERPL-MCNC: 0.75 MG/DL
EOSINOPHIL # BLD AUTO: 0.16 K/UL
EOSINOPHIL NFR BLD AUTO: 4.5 %
ESTIMATED AVERAGE GLUCOSE: 114 MG/DL
GLUCOSE SERPL-MCNC: 100 MG/DL
HBA1C MFR BLD HPLC: 5.6 %
HCT VFR BLD CALC: 43.1 %
HDLC SERPL-MCNC: 23 MG/DL
HGB BLD-MCNC: 14.6 G/DL
IMM GRANULOCYTES NFR BLD AUTO: 0.3 %
LDLC SERPL CALC-MCNC: 67 MG/DL
LYMPHOCYTES # BLD AUTO: 1.82 K/UL
LYMPHOCYTES NFR BLD AUTO: 50.7 %
MAN DIFF?: NORMAL
MCHC RBC-ENTMCNC: 31.3 PG
MCHC RBC-ENTMCNC: 33.9 GM/DL
MCV RBC AUTO: 92.5 FL
MONOCYTES # BLD AUTO: 0.39 K/UL
MONOCYTES NFR BLD AUTO: 10.9 %
NEUTROPHILS # BLD AUTO: 1.19 K/UL
NEUTROPHILS NFR BLD AUTO: 33 %
PLATELET # BLD AUTO: 220 K/UL
POTASSIUM SERPL-SCNC: 4.5 MMOL/L
PROT SERPL-MCNC: 6.9 G/DL
RBC # BLD: 4.66 M/UL
RBC # FLD: 12.6 %
SODIUM SERPL-SCNC: 138 MMOL/L
TRIGL SERPL-MCNC: 172 MG/DL
WBC # FLD AUTO: 3.59 K/UL

## 2020-03-10 RX ORDER — ZOLPIDEM TARTRATE 10 MG/1
10 TABLET ORAL
Qty: 30 | Refills: 4 | Status: DISCONTINUED | COMMUNITY
Start: 2017-01-31 | End: 2020-03-10

## 2020-05-14 ENCOUNTER — APPOINTMENT (OUTPATIENT)
Dept: FAMILY MEDICINE | Facility: CLINIC | Age: 65
End: 2020-05-14
Payer: MEDICARE

## 2020-05-14 VITALS
RESPIRATION RATE: 20 BRPM | WEIGHT: 205 LBS | HEART RATE: 76 BPM | SYSTOLIC BLOOD PRESSURE: 125 MMHG | DIASTOLIC BLOOD PRESSURE: 70 MMHG | HEIGHT: 70 IN | BODY MASS INDEX: 29.35 KG/M2

## 2020-05-14 DIAGNOSIS — B34.9 VIRAL INFECTION, UNSPECIFIED: ICD-10-CM

## 2020-05-14 PROCEDURE — 36415 COLL VENOUS BLD VENIPUNCTURE: CPT

## 2020-05-14 PROCEDURE — 99214 OFFICE O/P EST MOD 30 MIN: CPT | Mod: 25

## 2020-05-14 NOTE — ASSESSMENT
[FreeTextEntry1] : Hemodynamically stable with acceptable BP\par Musculoskeletal findings consistent with history\par Continue proper use of all medication\par Lab profiles drawn in office and sent

## 2020-05-14 NOTE — PHYSICAL EXAM
[No Acute Distress] : no acute distress [Normal] : no respiratory distress, lungs were clear to auscultation bilaterally and no accessory muscle use [No Edema] : there was no peripheral edema [Soft] : abdomen soft [Non Tender] : non-tender [Muscle Spasms, Bilateral] : bilateral muscle spasms [Coordination Grossly Intact] : coordination grossly intact [No Focal Deficits] : no focal deficits [Normal Gait] : normal gait [Memory Grossly Normal] : memory grossly normal [Speech Grossly Normal] : speech grossly normal [Normal Affect] : the affect was normal [Alert and Oriented x3] : oriented to person, place, and time [Normal Mood] : the mood was normal [Normal Insight/Judgement] : insight and judgment were intact [de-identified] : tender on palpation

## 2020-05-14 NOTE — REVIEW OF SYSTEMS
[Back Pain] : back pain [Suicidal] : not suicidal [Insomnia] : insomnia [Anxiety] : anxiety [Depression] : no depression [FreeTextEntry9] : as in HPI [Negative] : Neurological [de-identified] : as in HPI

## 2020-05-14 NOTE — HISTORY OF PRESENT ILLNESS
[de-identified] : Presents for BP check, labs, and general follow-up.  States feeling generally well; does have ongoing back issues - using pain mgt very prudently to maintain daily functioning; also using sleep aid for insomnia due to anxiety with good relief.  Note pt is alert, conversant, with no evidence of intoxication or euphoria.  Trying to watch diet and maintain activity.  Pt requests COVID-19 antibodies with today's labs.

## 2020-05-15 LAB
ALBUMIN SERPL ELPH-MCNC: 4.6 G/DL
ALP BLD-CCNC: 70 U/L
ALT SERPL-CCNC: 32 U/L
ANION GAP SERPL CALC-SCNC: 14 MMOL/L
AST SERPL-CCNC: 33 U/L
BASOPHILS # BLD AUTO: 0.02 K/UL
BASOPHILS NFR BLD AUTO: 0.5 %
BILIRUB SERPL-MCNC: 0.4 MG/DL
BUN SERPL-MCNC: 18 MG/DL
CALCIUM SERPL-MCNC: 9.1 MG/DL
CHLORIDE SERPL-SCNC: 103 MMOL/L
CHOLEST SERPL-MCNC: 154 MG/DL
CHOLEST/HDLC SERPL: 6.4 RATIO
CO2 SERPL-SCNC: 21 MMOL/L
CREAT SERPL-MCNC: 0.79 MG/DL
EOSINOPHIL # BLD AUTO: 0.1 K/UL
EOSINOPHIL NFR BLD AUTO: 2.3 %
GLUCOSE SERPL-MCNC: 111 MG/DL
HCT VFR BLD CALC: 41.7 %
HDLC SERPL-MCNC: 24 MG/DL
HGB BLD-MCNC: 14.3 G/DL
IMM GRANULOCYTES NFR BLD AUTO: 0 %
LDLC SERPL CALC-MCNC: 110 MG/DL
LYMPHOCYTES # BLD AUTO: 1.9 K/UL
LYMPHOCYTES NFR BLD AUTO: 43.8 %
MAN DIFF?: NORMAL
MCHC RBC-ENTMCNC: 31.6 PG
MCHC RBC-ENTMCNC: 34.3 GM/DL
MCV RBC AUTO: 92.1 FL
MONOCYTES # BLD AUTO: 0.49 K/UL
MONOCYTES NFR BLD AUTO: 11.3 %
NEUTROPHILS # BLD AUTO: 1.83 K/UL
NEUTROPHILS NFR BLD AUTO: 42.1 %
PLATELET # BLD AUTO: 221 K/UL
POTASSIUM SERPL-SCNC: 4.4 MMOL/L
PROT SERPL-MCNC: 6.7 G/DL
PSA FREE FLD-MCNC: 22 %
PSA FREE SERPL-MCNC: 0.62 NG/ML
PSA SERPL-MCNC: 2.88 NG/ML
RBC # BLD: 4.53 M/UL
RBC # FLD: 12.6 %
SARS-COV-2 IGG SERPL IA-ACNC: <0.1 INDEX
SARS-COV-2 IGG SERPL QL IA: NEGATIVE
SODIUM SERPL-SCNC: 138 MMOL/L
TRIGL SERPL-MCNC: 101 MG/DL
WBC # FLD AUTO: 4.34 K/UL

## 2020-07-23 ENCOUNTER — OUTPATIENT (OUTPATIENT)
Dept: OUTPATIENT SERVICES | Facility: HOSPITAL | Age: 65
LOS: 1 days | End: 2020-07-23
Payer: MEDICARE

## 2020-07-23 ENCOUNTER — APPOINTMENT (OUTPATIENT)
Dept: CT IMAGING | Facility: HOSPITAL | Age: 65
End: 2020-07-23
Payer: MEDICARE
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
## 2020-07-23 DIAGNOSIS — Z00.8 ENCOUNTER FOR OTHER GENERAL EXAMINATION: ICD-10-CM
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
## 2020-07-23 DIAGNOSIS — R31.9 HEMATURIA, UNSPECIFIED: ICD-10-CM
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
## 2020-07-23 PROCEDURE — 74178 CT ABD&PLV WO CNTR FLWD CNTR: CPT | Mod: 26

## 2020-07-23 PROCEDURE — 82565 ASSAY OF CREATININE: CPT

## 2020-07-23 PROCEDURE — 74178 CT ABD&PLV WO CNTR FLWD CNTR: CPT

## 2020-08-12 ENCOUNTER — APPOINTMENT (OUTPATIENT)
Dept: FAMILY MEDICINE | Facility: CLINIC | Age: 65
End: 2020-08-12
Payer: MEDICARE

## 2020-08-12 VITALS
BODY MASS INDEX: 26.77 KG/M2 | WEIGHT: 187 LBS | HEART RATE: 76 BPM | RESPIRATION RATE: 20 BRPM | DIASTOLIC BLOOD PRESSURE: 70 MMHG | SYSTOLIC BLOOD PRESSURE: 124 MMHG | HEIGHT: 70 IN

## 2020-08-12 DIAGNOSIS — F41.9 ANXIETY DISORDER, UNSPECIFIED: ICD-10-CM

## 2020-08-12 DIAGNOSIS — F32.9 ANXIETY DISORDER, UNSPECIFIED: ICD-10-CM

## 2020-08-12 PROCEDURE — 99214 OFFICE O/P EST MOD 30 MIN: CPT | Mod: 25

## 2020-08-12 PROCEDURE — 36415 COLL VENOUS BLD VENIPUNCTURE: CPT

## 2020-08-12 NOTE — HISTORY OF PRESENT ILLNESS
[de-identified] : Presents for BP check, labs, and general follow-up.  States feeling generally well - trying to watch diet - note very desirable wt loss.  Reviewed recent trauma with hematuria - reviewed CT report via PAX - Urology F/U scheduled.  Denies urinary symptoms at present.  Reviewed medication - using all pain mgt and antidepressant appropriately to maintain good QOL with no adverse effects.

## 2020-08-12 NOTE — PHYSICAL EXAM
[No Acute Distress] : no acute distress [Non Tender] : non-tender [No Edema] : there was no peripheral edema [Soft] : abdomen soft [Normal Posterior Cervical Nodes] : no posterior cervical lymphadenopathy [Muscle Spasms, Bilateral] : bilateral muscle spasms [Normal Anterior Cervical Nodes] : no anterior cervical lymphadenopathy [Normal] : no joint swelling and grossly normal strength and tone [No Focal Deficits] : no focal deficits [Coordination Grossly Intact] : coordination grossly intact [Normal Gait] : normal gait [Speech Grossly Normal] : speech grossly normal [Memory Grossly Normal] : memory grossly normal [Normal Affect] : the affect was normal [Alert and Oriented x3] : oriented to person, place, and time [Normal Mood] : the mood was normal [Normal Insight/Judgement] : insight and judgment were intact

## 2020-08-12 NOTE — REVIEW OF SYSTEMS
[Muscle Pain] : muscle pain [Muscle Weakness] : muscle weakness [Back Pain] : back pain [FreeTextEntry9] : consistent with history [Negative] : Psychiatric

## 2020-08-12 NOTE — ASSESSMENT
[FreeTextEntry1] : Hemodynamically stable with acceptable BP\par Musculoskeletal findings consistent with history - continue proper use of all medication\par Anxiety/depression well managed with current regime\par Lab profiles drawn in office and sent

## 2020-08-14 LAB
ALBUMIN SERPL ELPH-MCNC: 4.5 G/DL
ALP BLD-CCNC: 81 U/L
ALT SERPL-CCNC: 23 U/L
ANION GAP SERPL CALC-SCNC: 12 MMOL/L
AST SERPL-CCNC: 22 U/L
BASOPHILS # BLD AUTO: 0.03 K/UL
BASOPHILS NFR BLD AUTO: 0.5 %
BILIRUB SERPL-MCNC: 0.5 MG/DL
BUN SERPL-MCNC: 19 MG/DL
CALCIUM SERPL-MCNC: 8.8 MG/DL
CHLORIDE SERPL-SCNC: 102 MMOL/L
CHOLEST SERPL-MCNC: 143 MG/DL
CHOLEST/HDLC SERPL: 4.6 RATIO
CO2 SERPL-SCNC: 23 MMOL/L
CREAT SERPL-MCNC: 0.72 MG/DL
EOSINOPHIL # BLD AUTO: 0.14 K/UL
EOSINOPHIL NFR BLD AUTO: 2.5 %
GLUCOSE SERPL-MCNC: 121 MG/DL
HCT VFR BLD CALC: 41.9 %
HDLC SERPL-MCNC: 31 MG/DL
HGB BLD-MCNC: 13.4 G/DL
IMM GRANULOCYTES NFR BLD AUTO: 0.2 %
LDLC SERPL CALC-MCNC: 83 MG/DL
LYMPHOCYTES # BLD AUTO: 1.69 K/UL
LYMPHOCYTES NFR BLD AUTO: 30.5 %
MAN DIFF?: NORMAL
MCHC RBC-ENTMCNC: 31.9 PG
MCHC RBC-ENTMCNC: 32 GM/DL
MCV RBC AUTO: 99.8 FL
MONOCYTES # BLD AUTO: 0.5 K/UL
MONOCYTES NFR BLD AUTO: 9 %
NEUTROPHILS # BLD AUTO: 3.18 K/UL
NEUTROPHILS NFR BLD AUTO: 57.3 %
PLATELET # BLD AUTO: 221 K/UL
POTASSIUM SERPL-SCNC: 4.5 MMOL/L
PROT SERPL-MCNC: 6.3 G/DL
PSA FREE FLD-MCNC: 18 %
PSA FREE SERPL-MCNC: 0.58 NG/ML
PSA SERPL-MCNC: 3.19 NG/ML
RBC # BLD: 4.2 M/UL
RBC # FLD: 13.1 %
SODIUM SERPL-SCNC: 137 MMOL/L
TRIGL SERPL-MCNC: 146 MG/DL
WBC # FLD AUTO: 5.55 K/UL

## 2020-09-08 ENCOUNTER — APPOINTMENT (OUTPATIENT)
Dept: FAMILY MEDICINE | Facility: CLINIC | Age: 65
End: 2020-09-08

## 2020-12-01 ENCOUNTER — APPOINTMENT (OUTPATIENT)
Dept: FAMILY MEDICINE | Facility: CLINIC | Age: 65
End: 2020-12-01

## 2020-12-31 ENCOUNTER — APPOINTMENT (OUTPATIENT)
Dept: FAMILY MEDICINE | Facility: CLINIC | Age: 65
End: 2020-12-31
Payer: MEDICARE

## 2020-12-31 VITALS
SYSTOLIC BLOOD PRESSURE: 124 MMHG | RESPIRATION RATE: 20 BRPM | HEART RATE: 76 BPM | HEIGHT: 70 IN | DIASTOLIC BLOOD PRESSURE: 70 MMHG | WEIGHT: 192 LBS | BODY MASS INDEX: 27.49 KG/M2

## 2020-12-31 DIAGNOSIS — T14.8XXA OTHER INJURY OF UNSPECIFIED BODY REGION, INITIAL ENCOUNTER: ICD-10-CM

## 2020-12-31 PROCEDURE — 99214 OFFICE O/P EST MOD 30 MIN: CPT | Mod: 25

## 2020-12-31 PROCEDURE — 36415 COLL VENOUS BLD VENIPUNCTURE: CPT

## 2020-12-31 NOTE — HISTORY OF PRESENT ILLNESS
[de-identified] : Presents for BP check, labs, and general follow-up.  States having persistent pain R collarbone (had contused it several months ago); also states having increasing back and now neck pain and tightness - note pt has known spinal stenosis - using medication appropriately to maintain functioning - states "I wouldn't be able to move without it."  Does request in increase in Oxycontin at night as increased discomfort is interfering with sleep.  Note pt is alert, conversant, with no evidence of intoxication or euphoria.  Trying to watch diet; acknowledges a wt gain.

## 2020-12-31 NOTE — ASSESSMENT
[FreeTextEntry1] : Hemodynamically stable with acceptable BP\par Musculoskeletal findings consistent with history - continue judicious use of all medication\par S/P contusion R clavicle with persistent pain - imaging ordered\par Lab profiles drawn in office and sent

## 2020-12-31 NOTE — PHYSICAL EXAM
[No Acute Distress] : no acute distress [Tenderness] : was tender [Diffuse] : posterior [Normal] : normal rate, regular rhythm, normal S1 and S2 and no murmur heard [No Edema] : there was no peripheral edema [Soft] : abdomen soft [Non Tender] : non-tender [Normal Posterior Cervical Nodes] : no posterior cervical lymphadenopathy [Normal Anterior Cervical Nodes] : no anterior cervical lymphadenopathy [Muscle Spasms, Bilateral] : bilateral muscle spasms [Coordination Grossly Intact] : coordination grossly intact [No Focal Deficits] : no focal deficits [Normal Gait] : normal gait [Speech Grossly Normal] : speech grossly normal [Memory Grossly Normal] : memory grossly normal [Normal Affect] : the affect was normal [Alert and Oriented x3] : oriented to person, place, and time [Normal Mood] : the mood was normal [Normal Insight/Judgement] : insight and judgment were intact [de-identified] : prominent R clavicle with tenderness on palpation

## 2021-01-02 LAB
ALBUMIN SERPL ELPH-MCNC: 4.7 G/DL
ALP BLD-CCNC: 87 U/L
ALT SERPL-CCNC: 21 U/L
ANION GAP SERPL CALC-SCNC: 12 MMOL/L
AST SERPL-CCNC: 21 U/L
BASOPHILS # BLD AUTO: 0.02 K/UL
BASOPHILS NFR BLD AUTO: 0.4 %
BILIRUB SERPL-MCNC: 0.5 MG/DL
BUN SERPL-MCNC: 16 MG/DL
CALCIUM SERPL-MCNC: 8.8 MG/DL
CHLORIDE SERPL-SCNC: 103 MMOL/L
CHOLEST SERPL-MCNC: 188 MG/DL
CO2 SERPL-SCNC: 24 MMOL/L
CREAT SERPL-MCNC: 0.77 MG/DL
EOSINOPHIL # BLD AUTO: 0.15 K/UL
EOSINOPHIL NFR BLD AUTO: 2.8 %
FOLATE SERPL-MCNC: >20 NG/ML
GLUCOSE SERPL-MCNC: 94 MG/DL
HCT VFR BLD CALC: 42.3 %
HDLC SERPL-MCNC: 28 MG/DL
HGB BLD-MCNC: 14.4 G/DL
IMM GRANULOCYTES NFR BLD AUTO: 0.2 %
LDLC SERPL CALC-MCNC: 122 MG/DL
LYMPHOCYTES # BLD AUTO: 1.97 K/UL
LYMPHOCYTES NFR BLD AUTO: 36.8 %
MAN DIFF?: NORMAL
MCHC RBC-ENTMCNC: 32.1 PG
MCHC RBC-ENTMCNC: 34 GM/DL
MCV RBC AUTO: 94.2 FL
MONOCYTES # BLD AUTO: 0.53 K/UL
MONOCYTES NFR BLD AUTO: 9.9 %
NEUTROPHILS # BLD AUTO: 2.67 K/UL
NEUTROPHILS NFR BLD AUTO: 49.9 %
NONHDLC SERPL-MCNC: 160 MG/DL
PLATELET # BLD AUTO: 214 K/UL
POTASSIUM SERPL-SCNC: 4.5 MMOL/L
PROT SERPL-MCNC: 6.9 G/DL
PSA FREE FLD-MCNC: 22 %
PSA FREE SERPL-MCNC: 0.84 NG/ML
PSA SERPL-MCNC: 3.81 NG/ML
RBC # BLD: 4.49 M/UL
RBC # FLD: 12.4 %
SODIUM SERPL-SCNC: 138 MMOL/L
TRIGL SERPL-MCNC: 188 MG/DL
VIT B12 SERPL-MCNC: >2000 PG/ML
WBC # FLD AUTO: 5.35 K/UL

## 2021-01-26 ENCOUNTER — OUTPATIENT (OUTPATIENT)
Dept: OUTPATIENT SERVICES | Facility: HOSPITAL | Age: 66
LOS: 1 days | End: 2021-01-26
Payer: MEDICARE

## 2021-01-26 ENCOUNTER — RESULT REVIEW (OUTPATIENT)
Age: 66
End: 2021-01-26

## 2021-01-26 ENCOUNTER — APPOINTMENT (OUTPATIENT)
Dept: RADIOLOGY | Facility: HOSPITAL | Age: 66
End: 2021-01-26
Payer: MEDICARE

## 2021-01-26 DIAGNOSIS — M25.511 PAIN IN RIGHT SHOULDER: ICD-10-CM
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
## 2021-01-26 DIAGNOSIS — T14.8XXA OTHER INJURY OF UNSPECIFIED BODY REGION, INITIAL ENCOUNTER: ICD-10-CM
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
## 2021-01-26 PROCEDURE — 73000 X-RAY EXAM OF COLLAR BONE: CPT | Mod: 26,RT

## 2021-01-26 PROCEDURE — 73000 X-RAY EXAM OF COLLAR BONE: CPT

## 2021-03-23 ENCOUNTER — APPOINTMENT (OUTPATIENT)
Dept: FAMILY MEDICINE | Facility: CLINIC | Age: 66
End: 2021-03-23
Payer: MEDICARE

## 2021-03-23 VITALS
BODY MASS INDEX: 27.92 KG/M2 | HEART RATE: 76 BPM | RESPIRATION RATE: 20 BRPM | DIASTOLIC BLOOD PRESSURE: 80 MMHG | HEIGHT: 70 IN | WEIGHT: 195 LBS | SYSTOLIC BLOOD PRESSURE: 124 MMHG

## 2021-03-23 PROCEDURE — 36415 COLL VENOUS BLD VENIPUNCTURE: CPT

## 2021-03-23 PROCEDURE — 99214 OFFICE O/P EST MOD 30 MIN: CPT | Mod: 25

## 2021-03-23 NOTE — ASSESSMENT
[FreeTextEntry1] : Hemodynamically stable with acceptable BP\par Musculoskeletal findings consistent with history - continue proper use of medication\par Chronic insomnia well managed at present\par Lab profiles drawn in office and sent

## 2021-03-23 NOTE — HISTORY OF PRESENT ILLNESS
[de-identified] : Presents for BP check, labs, and general follow-up.  States trying to watch diet, although does acknowledge a small wt gain.  Trying to stay active, walking, stretching to maintain mobility with known spinal stenosis - states current pain mgt definitely makes a difference in his QOL and ability to maintain functioning.  Note changed from Oxycontin to Xtampza per insurance requirement (unable to fill the Oxycontin sent earlier this month).  Note associated insomnia well managed with current medication regime.  Also complains of some sinus and upper airway congestion and plans to see ENT.

## 2021-03-23 NOTE — PHYSICAL EXAM
[No Acute Distress] : no acute distress [Normal] : normal rate, regular rhythm, normal S1 and S2 and no murmur heard [No Edema] : there was no peripheral edema [Soft] : abdomen soft [Non Tender] : non-tender [Normal Posterior Cervical Nodes] : no posterior cervical lymphadenopathy [Normal Anterior Cervical Nodes] : no anterior cervical lymphadenopathy [Muscle Spasms, Bilateral] : bilateral muscle spasms [Coordination Grossly Intact] : coordination grossly intact [No Focal Deficits] : no focal deficits [Normal Gait] : normal gait [Speech Grossly Normal] : speech grossly normal [Memory Grossly Normal] : memory grossly normal [Normal Affect] : the affect was normal [Alert and Oriented x3] : oriented to person, place, and time [Normal Mood] : the mood was normal [Normal Insight/Judgement] : insight and judgment were intact [de-identified] : congestion without injection

## 2021-03-25 LAB
ALBUMIN SERPL ELPH-MCNC: 4.8 G/DL
ALP BLD-CCNC: 91 U/L
ALT SERPL-CCNC: 16 U/L
ANION GAP SERPL CALC-SCNC: 13 MMOL/L
AST SERPL-CCNC: 15 U/L
BASOPHILS # BLD AUTO: 0.03 K/UL
BASOPHILS NFR BLD AUTO: 0.5 %
BILIRUB SERPL-MCNC: 0.4 MG/DL
BUN SERPL-MCNC: 16 MG/DL
CALCIUM SERPL-MCNC: 9.3 MG/DL
CHLORIDE SERPL-SCNC: 103 MMOL/L
CHOLEST SERPL-MCNC: 172 MG/DL
CO2 SERPL-SCNC: 22 MMOL/L
CREAT SERPL-MCNC: 0.68 MG/DL
EOSINOPHIL # BLD AUTO: 0.09 K/UL
EOSINOPHIL NFR BLD AUTO: 1.6 %
FOLATE SERPL-MCNC: >20 NG/ML
GLUCOSE SERPL-MCNC: 110 MG/DL
HCT VFR BLD CALC: 46.4 %
HDLC SERPL-MCNC: 29 MG/DL
HGB BLD-MCNC: 15.3 G/DL
IMM GRANULOCYTES NFR BLD AUTO: 0.2 %
LDLC SERPL CALC-MCNC: 111 MG/DL
LYMPHOCYTES # BLD AUTO: 2.04 K/UL
LYMPHOCYTES NFR BLD AUTO: 36 %
MAN DIFF?: NORMAL
MCHC RBC-ENTMCNC: 31.2 PG
MCHC RBC-ENTMCNC: 33 GM/DL
MCV RBC AUTO: 94.5 FL
MONOCYTES # BLD AUTO: 0.51 K/UL
MONOCYTES NFR BLD AUTO: 9 %
NEUTROPHILS # BLD AUTO: 2.99 K/UL
NEUTROPHILS NFR BLD AUTO: 52.7 %
NONHDLC SERPL-MCNC: 143 MG/DL
PLATELET # BLD AUTO: 251 K/UL
POTASSIUM SERPL-SCNC: 4.6 MMOL/L
PROT SERPL-MCNC: 6.2 G/DL
PSA FREE FLD-MCNC: 18 %
PSA FREE SERPL-MCNC: 0.65 NG/ML
PSA SERPL-MCNC: 3.67 NG/ML
RBC # BLD: 4.91 M/UL
RBC # FLD: 12.6 %
SODIUM SERPL-SCNC: 138 MMOL/L
TRIGL SERPL-MCNC: 160 MG/DL
VIT B12 SERPL-MCNC: >2000 PG/ML
WBC # FLD AUTO: 5.67 K/UL

## 2021-07-27 ENCOUNTER — APPOINTMENT (OUTPATIENT)
Dept: FAMILY MEDICINE | Facility: CLINIC | Age: 66
End: 2021-07-27
Payer: MEDICARE

## 2021-07-27 VITALS
RESPIRATION RATE: 20 BRPM | HEIGHT: 70 IN | SYSTOLIC BLOOD PRESSURE: 126 MMHG | DIASTOLIC BLOOD PRESSURE: 70 MMHG | HEART RATE: 76 BPM | WEIGHT: 187 LBS | BODY MASS INDEX: 26.77 KG/M2

## 2021-07-27 PROCEDURE — 99214 OFFICE O/P EST MOD 30 MIN: CPT | Mod: 25

## 2021-07-27 PROCEDURE — 36415 COLL VENOUS BLD VENIPUNCTURE: CPT

## 2021-07-27 NOTE — REVIEW OF SYSTEMS
[Muscle Pain] : muscle pain [Back Pain] : back pain [Insomnia] : insomnia [Anxiety] : anxiety [Negative] : Neurological

## 2021-07-27 NOTE — ASSESSMENT
[FreeTextEntry1] : Stable exam with findings consistent with history\par Lab profiles drawn in office and sent

## 2021-07-27 NOTE — PHYSICAL EXAM
[No Acute Distress] : no acute distress [No Edema] : there was no peripheral edema [Normal] : soft, non-tender, non-distended, no masses palpated, no HSM and normal bowel sounds [Normal Posterior Cervical Nodes] : no posterior cervical lymphadenopathy [Normal Anterior Cervical Nodes] : no anterior cervical lymphadenopathy [Muscle Spasms, Bilateral] : bilateral muscle spasms [Coordination Grossly Intact] : coordination grossly intact [No Focal Deficits] : no focal deficits [Normal Gait] : normal gait [Speech Grossly Normal] : speech grossly normal [Memory Grossly Normal] : memory grossly normal [Normal Affect] : the affect was normal [Normal Mood] : the mood was normal [Alert and Oriented x3] : oriented to person, place, and time [Normal Insight/Judgement] : insight and judgment were intact

## 2021-07-27 NOTE — HISTORY OF PRESENT ILLNESS
[de-identified] : Presents for BP check, labs, and general follow-up.  Reviewed medication and renewed as requested.  Pt has known spinal stenosis with associated anxiety and insomnia - using all medication very appropriately and judiciously to maintain good daily functioning and QOL with no adverse effects.  Pt has been actively watching diet - note desirable wt loss.

## 2021-07-28 LAB
ALBUMIN SERPL ELPH-MCNC: 4.7 G/DL
ALP BLD-CCNC: 90 U/L
ALT SERPL-CCNC: 18 U/L
ANION GAP SERPL CALC-SCNC: 16 MMOL/L
AST SERPL-CCNC: 18 U/L
BASOPHILS # BLD AUTO: 0.02 K/UL
BASOPHILS NFR BLD AUTO: 0.4 %
BILIRUB SERPL-MCNC: 0.3 MG/DL
BUN SERPL-MCNC: 16 MG/DL
CALCIUM SERPL-MCNC: 9.4 MG/DL
CHLORIDE SERPL-SCNC: 103 MMOL/L
CHOLEST SERPL-MCNC: 165 MG/DL
CO2 SERPL-SCNC: 22 MMOL/L
CREAT SERPL-MCNC: 0.8 MG/DL
EOSINOPHIL # BLD AUTO: 0.09 K/UL
EOSINOPHIL NFR BLD AUTO: 2 %
GLUCOSE SERPL-MCNC: 101 MG/DL
HCT VFR BLD CALC: 44.2 %
HDLC SERPL-MCNC: 32 MG/DL
HGB BLD-MCNC: 15.2 G/DL
IMM GRANULOCYTES NFR BLD AUTO: 0.2 %
LDLC SERPL CALC-MCNC: 104 MG/DL
LYMPHOCYTES # BLD AUTO: 1.84 K/UL
LYMPHOCYTES NFR BLD AUTO: 40.1 %
MAN DIFF?: NORMAL
MCHC RBC-ENTMCNC: 31.3 PG
MCHC RBC-ENTMCNC: 34.4 GM/DL
MCV RBC AUTO: 91.1 FL
MONOCYTES # BLD AUTO: 0.52 K/UL
MONOCYTES NFR BLD AUTO: 11.3 %
NEUTROPHILS # BLD AUTO: 2.11 K/UL
NEUTROPHILS NFR BLD AUTO: 46 %
NONHDLC SERPL-MCNC: 133 MG/DL
PLATELET # BLD AUTO: 259 K/UL
POTASSIUM SERPL-SCNC: 4.7 MMOL/L
PROT SERPL-MCNC: 7.2 G/DL
PSA FREE FLD-MCNC: 14 %
PSA FREE SERPL-MCNC: 0.62 NG/ML
PSA SERPL-MCNC: 4.34 NG/ML
RBC # BLD: 4.85 M/UL
RBC # FLD: 12.2 %
SODIUM SERPL-SCNC: 141 MMOL/L
T4 FREE SERPL-MCNC: 1.5 NG/DL
TRIGL SERPL-MCNC: 146 MG/DL
TSH SERPL-ACNC: 1.6 UIU/ML
WBC # FLD AUTO: 4.59 K/UL

## 2021-08-24 ENCOUNTER — RX RENEWAL (OUTPATIENT)
Age: 66
End: 2021-08-24

## 2021-11-23 ENCOUNTER — APPOINTMENT (OUTPATIENT)
Dept: FAMILY MEDICINE | Facility: CLINIC | Age: 66
End: 2021-11-23
Payer: MEDICARE

## 2021-11-23 VITALS
SYSTOLIC BLOOD PRESSURE: 125 MMHG | DIASTOLIC BLOOD PRESSURE: 70 MMHG | RESPIRATION RATE: 20 BRPM | BODY MASS INDEX: 26.92 KG/M2 | HEIGHT: 70 IN | HEART RATE: 76 BPM | WEIGHT: 188 LBS

## 2021-11-23 DIAGNOSIS — G47.00 INSOMNIA, UNSPECIFIED: ICD-10-CM

## 2021-11-23 PROCEDURE — 36415 COLL VENOUS BLD VENIPUNCTURE: CPT

## 2021-11-23 PROCEDURE — 99214 OFFICE O/P EST MOD 30 MIN: CPT | Mod: 25

## 2021-11-23 NOTE — PHYSICAL EXAM
[No Acute Distress] : no acute distress [Normal] : normal rate, regular rhythm, normal S1 and S2 and no murmur heard [No Edema] : there was no peripheral edema [Soft] : abdomen soft [Non Tender] : non-tender [Normal Posterior Cervical Nodes] : no posterior cervical lymphadenopathy [Normal Anterior Cervical Nodes] : no anterior cervical lymphadenopathy [Muscle Spasms, Bilateral] : bilateral muscle spasms [Coordination Grossly Intact] : coordination grossly intact [No Focal Deficits] : no focal deficits [Normal Gait] : normal gait [Speech Grossly Normal] : speech grossly normal [Memory Grossly Normal] : memory grossly normal [Normal Affect] : the affect was normal [Alert and Oriented x3] : oriented to person, place, and time [Normal Mood] : the mood was normal [Normal Insight/Judgement] : insight and judgment were intact

## 2021-11-23 NOTE — HISTORY OF PRESENT ILLNESS
[de-identified] : Presents for BP check, labs, and general follow-up.  States trying to watch diet and maintain activity.  Reviewed medication and renewed as requested - using all medication appropriately and judiciously to maintain good daily functioning with no adverse effects.

## 2021-11-23 NOTE — ASSESSMENT
[FreeTextEntry1] : Hemodynamically stable with acceptable BP\par Ongoing back issues with findings consistent with history\par Continue proper use of all medication\par Lab profiles drawn in office and sent

## 2021-11-23 NOTE — REVIEW OF SYSTEMS
[Back Pain] : back pain [Suicidal] : not suicidal [Insomnia] : insomnia [Anxiety] : no anxiety [Depression] : no depression [Negative] : Neurological

## 2021-11-24 LAB
ALBUMIN SERPL ELPH-MCNC: 4.9 G/DL
ALP BLD-CCNC: 92 U/L
ALT SERPL-CCNC: 19 U/L
ANION GAP SERPL CALC-SCNC: 12 MMOL/L
AST SERPL-CCNC: 20 U/L
BASOPHILS # BLD AUTO: 0.02 K/UL
BASOPHILS NFR BLD AUTO: 0.4 %
BILIRUB SERPL-MCNC: 0.5 MG/DL
BUN SERPL-MCNC: 13 MG/DL
CALCIUM SERPL-MCNC: 9.2 MG/DL
CHLORIDE SERPL-SCNC: 103 MMOL/L
CHOLEST SERPL-MCNC: 191 MG/DL
CO2 SERPL-SCNC: 24 MMOL/L
CREAT SERPL-MCNC: 0.74 MG/DL
EOSINOPHIL # BLD AUTO: 0.14 K/UL
EOSINOPHIL NFR BLD AUTO: 2.9 %
ESTIMATED AVERAGE GLUCOSE: 108 MG/DL
GLUCOSE SERPL-MCNC: 97 MG/DL
HBA1C MFR BLD HPLC: 5.4 %
HCT VFR BLD CALC: 44.6 %
HDLC SERPL-MCNC: 33 MG/DL
HGB BLD-MCNC: 14.9 G/DL
IMM GRANULOCYTES NFR BLD AUTO: 0.2 %
LDLC SERPL CALC-MCNC: 130 MG/DL
LYMPHOCYTES # BLD AUTO: 1.86 K/UL
LYMPHOCYTES NFR BLD AUTO: 38.8 %
MAN DIFF?: NORMAL
MCHC RBC-ENTMCNC: 31 PG
MCHC RBC-ENTMCNC: 33.4 GM/DL
MCV RBC AUTO: 92.9 FL
MONOCYTES # BLD AUTO: 0.45 K/UL
MONOCYTES NFR BLD AUTO: 9.4 %
NEUTROPHILS # BLD AUTO: 2.32 K/UL
NEUTROPHILS NFR BLD AUTO: 48.3 %
NONHDLC SERPL-MCNC: 157 MG/DL
PLATELET # BLD AUTO: 246 K/UL
POTASSIUM SERPL-SCNC: 4.7 MMOL/L
PROT SERPL-MCNC: 6.8 G/DL
PSA SERPL-MCNC: 3.01 NG/ML
RBC # BLD: 4.8 M/UL
RBC # FLD: 12.4 %
SODIUM SERPL-SCNC: 138 MMOL/L
TRIGL SERPL-MCNC: 137 MG/DL
WBC # FLD AUTO: 4.8 K/UL

## 2022-03-15 ENCOUNTER — APPOINTMENT (OUTPATIENT)
Dept: FAMILY MEDICINE | Facility: CLINIC | Age: 67
End: 2022-03-15
Payer: MEDICARE

## 2022-03-15 VITALS
WEIGHT: 193 LBS | RESPIRATION RATE: 20 BRPM | HEART RATE: 76 BPM | DIASTOLIC BLOOD PRESSURE: 70 MMHG | HEIGHT: 70 IN | BODY MASS INDEX: 27.63 KG/M2 | SYSTOLIC BLOOD PRESSURE: 125 MMHG

## 2022-03-15 DIAGNOSIS — K90.49 MALABSORPTION DUE TO INTOLERANCE, NOT ELSEWHERE CLASSIFIED: ICD-10-CM

## 2022-03-15 PROCEDURE — 99214 OFFICE O/P EST MOD 30 MIN: CPT | Mod: 25

## 2022-03-15 PROCEDURE — 36415 COLL VENOUS BLD VENIPUNCTURE: CPT

## 2022-03-15 NOTE — ASSESSMENT
[FreeTextEntry1] : Hemodynamically stable with acceptable BP\par No gross neuro deficits - request for Neuro referral placed and staff to assist\par Known spinal stenosis with findings consistent - continue proper use of all medication\par Lab profiles drawn in office and sent\par Advised pt to schedule a CPE for next visit

## 2022-03-15 NOTE — REVIEW OF SYSTEMS
[Muscle Pain] : muscle pain [Back Pain] : back pain [Negative] : Psychiatric [de-identified] : as in HPI

## 2022-03-15 NOTE — PHYSICAL EXAM
[No Acute Distress] : no acute distress [No Edema] : there was no peripheral edema [Normal] : soft, non-tender, non-distended, no masses palpated, no HSM and normal bowel sounds [Normal Posterior Cervical Nodes] : no posterior cervical lymphadenopathy [Normal Anterior Cervical Nodes] : no anterior cervical lymphadenopathy [Muscle Spasms, Bilateral] : bilateral muscle spasms [Grossly Normal Strength/Tone] : grossly normal strength/tone [Coordination Grossly Intact] : coordination grossly intact [No Focal Deficits] : no focal deficits [Normal Gait] : normal gait [Speech Grossly Normal] : speech grossly normal [Memory Grossly Normal] : memory grossly normal [Normal Affect] : the affect was normal [Alert and Oriented x3] : oriented to person, place, and time [Normal Mood] : the mood was normal [Normal Insight/Judgement] : insight and judgment were intact

## 2022-03-15 NOTE — HISTORY OF PRESENT ILLNESS
[de-identified] : Presents for BP check, labs, and general follow-up.  States feeling generally well - is concerned about increasing headaches and "ringing in the ears" - discussed consulting Neurology.  Reviewed medication - current regimen enables good daily functioning and QOL with no adverse effects.

## 2022-03-16 LAB
ALBUMIN SERPL ELPH-MCNC: 4.8 G/DL
ALP BLD-CCNC: 79 U/L
ALT SERPL-CCNC: 25 U/L
ANION GAP SERPL CALC-SCNC: 12 MMOL/L
AST SERPL-CCNC: 20 U/L
BASOPHILS # BLD AUTO: 0.03 K/UL
BASOPHILS NFR BLD AUTO: 0.5 %
BILIRUB SERPL-MCNC: 0.4 MG/DL
BUN SERPL-MCNC: 15 MG/DL
CALCIUM SERPL-MCNC: 9.1 MG/DL
CHLORIDE SERPL-SCNC: 101 MMOL/L
CHOLEST SERPL-MCNC: 183 MG/DL
CO2 SERPL-SCNC: 22 MMOL/L
CREAT SERPL-MCNC: 0.77 MG/DL
EGFR: 99 ML/MIN/1.73M2
EOSINOPHIL # BLD AUTO: 0.08 K/UL
EOSINOPHIL NFR BLD AUTO: 1.3 %
ESTIMATED AVERAGE GLUCOSE: 111 MG/DL
GLUCOSE SERPL-MCNC: 104 MG/DL
HBA1C MFR BLD HPLC: 5.5 %
HCT VFR BLD CALC: 42.7 %
HDLC SERPL-MCNC: 32 MG/DL
HGB BLD-MCNC: 14.5 G/DL
IMM GRANULOCYTES NFR BLD AUTO: 0.3 %
LDLC SERPL CALC-MCNC: 127 MG/DL
LYMPHOCYTES # BLD AUTO: 1.74 K/UL
LYMPHOCYTES NFR BLD AUTO: 29.2 %
MAN DIFF?: NORMAL
MCHC RBC-ENTMCNC: 30.4 PG
MCHC RBC-ENTMCNC: 34 GM/DL
MCV RBC AUTO: 89.5 FL
MONOCYTES # BLD AUTO: 0.5 K/UL
MONOCYTES NFR BLD AUTO: 8.4 %
NEUTROPHILS # BLD AUTO: 3.58 K/UL
NEUTROPHILS NFR BLD AUTO: 60.3 %
NONHDLC SERPL-MCNC: 150 MG/DL
PLATELET # BLD AUTO: 252 K/UL
POTASSIUM SERPL-SCNC: 4.4 MMOL/L
PROT SERPL-MCNC: 7.2 G/DL
PSA SERPL-MCNC: 3.34 NG/ML
RBC # BLD: 4.77 M/UL
RBC # FLD: 13 %
SODIUM SERPL-SCNC: 136 MMOL/L
TRIGL SERPL-MCNC: 117 MG/DL
WBC # FLD AUTO: 5.95 K/UL

## 2022-04-05 ENCOUNTER — APPOINTMENT (OUTPATIENT)
Dept: NEUROLOGY | Facility: CLINIC | Age: 67
End: 2022-04-05
Payer: MEDICARE

## 2022-04-05 VITALS
SYSTOLIC BLOOD PRESSURE: 126 MMHG | DIASTOLIC BLOOD PRESSURE: 82 MMHG | HEART RATE: 80 BPM | WEIGHT: 193 LBS | HEIGHT: 70 IN | BODY MASS INDEX: 27.63 KG/M2

## 2022-04-05 DIAGNOSIS — H93.19 TINNITUS, UNSPECIFIED EAR: ICD-10-CM

## 2022-04-05 DIAGNOSIS — G51.39 CLONIC HEMIFACIAL SPASM, UNSPECIFIED: ICD-10-CM

## 2022-04-05 DIAGNOSIS — R41.3 OTHER AMNESIA: ICD-10-CM

## 2022-04-05 DIAGNOSIS — R51.9 HEADACHE, UNSPECIFIED: ICD-10-CM

## 2022-04-05 PROCEDURE — 99205 OFFICE O/P NEW HI 60 MIN: CPT

## 2022-04-05 NOTE — PHYSICAL EXAM
[FreeTextEntry1] : Head:  Normocephalic Neck: Supple nontender no carotid bruits.  \par \par Mental Status:  Alert Oriented X3 Speech normal and no aphasia or dysarthria.  Recalls 1 out of 3 words at 5 minutes.  He can spell world backwards.  He does not appear depressed.\par \par Cranial Nerves:  PERRL, Fundi normal Visual Fields full  EOMI no diplopia no ptosis no nystagmus, V through XII intact.  There are no abnormal facial movements seen.\par \par Motor:  No drift, normal strength tone and coordination and no focal atrophy. No abnormal movements. No dysmetria.  Normal rapid alternating movements. \par \par DTRs: Symmetric and 2+.  Plantars flexor.  No Clonus.\par \par Sensory:  Normal testing with pin light touch and vibration and  Joint position sense.  Normal DSS to touch.\par \par Gait:  Normal including tandem walking and the Romberg is negative.\par

## 2022-04-05 NOTE — HISTORY OF PRESENT ILLNESS
[FreeTextEntry1] : Matt Shay is seen for an office consultation.  He is a 66-year-old male patient with chronic lumbar spinal stenosis on narcotic analgesics anxiety and depression hyperlipidemia and BPH.  He presents with several complaints.  Both his parents have dementia in their later years.  The patient describes problems with memory especially short-term for the last several years.  In addition while in bed at night before sleep he describes bilateral poorly characterized facial movements almost like a quivering or spasm.  There is no associated symptomatology such as facial droop or numbness problem with speech or swallowing or any other associated symptomatology.  In addition while in bed at night before sleep he will occasionally have a loud screeching sound which travels from left to right ear lasting for seconds.  At the same time especially when he looks at his cell phone he will have a short duration severe posterior headache.  These latter complaints have all been present for approximately the last 2 years. \par \par Past history as noted significant for chronic pain treated with narcotic analgesics hyperlipidemia BPH and he describes chronic bilateral hearing loss and a buzzing bilateral ear tinnitus.  He is status post a right total knee replacement.\par \par Medications reviewed in detail including atorvastatin duloxetine Vicodin Xtampza ER tamsulosin piroxicam zolpidem.

## 2022-04-05 NOTE — ASSESSMENT
[FreeTextEntry1] : Impression: This 66-year-old male patient has multiple subjective complaints including problems with short-term memory and chronic tinnitus and also decreased bilateral hearing.  He also describes nighttime bilateral facial movements which are poorly characterized sharp posterior head pain and exacerbation of tinnitus.  There is a family history of dementia.  Today's neurological exam in the setting of remarkable only for decreasing recent memory recall.  The remainder the exam is intact.  The patient has a history of anxiety/depression and a chronic pain syndrome with lumbar spinal stenosis taking chronic narcotic analgesics.\par \par Recommendations: Further work-up is indicated including MRI of the brain including internal auditory canals to be performed both with and without contrast.  Consider ENT opinion.  Office follow-up as needed.\par

## 2022-04-05 NOTE — REASON FOR VISIT
[Initial Evaluation] : an initial evaluation [FreeTextEntry1] : Memory difficulty nighttime bilateral facial movements head pain and tinnitus

## 2022-04-05 NOTE — CONSULT LETTER
[Dear  ___] : Dear  [unfilled], [Consult Letter:] : I had the pleasure of evaluating your patient, [unfilled]. [Please see my note below.] : Please see my note below. [Consult Closing:] : Thank you very much for allowing me to participate in the care of this patient.  If you have any questions, please do not hesitate to contact me. [Sincerely,] : Sincerely, [FreeTextEntry3] : Kemar Cruz MD\par

## 2022-04-15 ENCOUNTER — OUTPATIENT (OUTPATIENT)
Dept: OUTPATIENT SERVICES | Facility: HOSPITAL | Age: 67
LOS: 1 days | End: 2022-04-15
Payer: MEDICARE

## 2022-04-15 ENCOUNTER — APPOINTMENT (OUTPATIENT)
Dept: MRI IMAGING | Facility: CLINIC | Age: 67
End: 2022-04-15
Payer: MEDICARE

## 2022-04-15 DIAGNOSIS — R51.9 HEADACHE, UNSPECIFIED: ICD-10-CM

## 2022-04-15 DIAGNOSIS — R41.3 OTHER AMNESIA: ICD-10-CM

## 2022-04-15 DIAGNOSIS — G51.39 CLONIC HEMIFACIAL SPASM, UNSPECIFIED: ICD-10-CM

## 2022-04-15 DIAGNOSIS — H93.19 TINNITUS, UNSPECIFIED EAR: ICD-10-CM

## 2022-04-15 PROCEDURE — 70553 MRI BRAIN STEM W/O & W/DYE: CPT | Mod: 26,MG

## 2022-04-15 PROCEDURE — 70553 MRI BRAIN STEM W/O & W/DYE: CPT | Mod: MG

## 2022-04-15 PROCEDURE — G1004: CPT

## 2022-04-15 PROCEDURE — A9585: CPT

## 2022-04-21 ENCOUNTER — NON-APPOINTMENT (OUTPATIENT)
Age: 67
End: 2022-04-21

## 2022-07-19 ENCOUNTER — APPOINTMENT (OUTPATIENT)
Dept: FAMILY MEDICINE | Facility: CLINIC | Age: 67
End: 2022-07-19

## 2022-07-19 VITALS
DIASTOLIC BLOOD PRESSURE: 70 MMHG | WEIGHT: 190 LBS | SYSTOLIC BLOOD PRESSURE: 125 MMHG | HEIGHT: 70 IN | RESPIRATION RATE: 20 BRPM | HEART RATE: 68 BPM | BODY MASS INDEX: 27.2 KG/M2

## 2022-07-19 PROCEDURE — 36415 COLL VENOUS BLD VENIPUNCTURE: CPT

## 2022-07-19 PROCEDURE — G0438: CPT

## 2022-07-19 PROCEDURE — 93000 ELECTROCARDIOGRAM COMPLETE: CPT | Mod: 59

## 2022-07-19 NOTE — PHYSICAL EXAM
[Normal Posterior Cervical Nodes] : no posterior cervical lymphadenopathy [Normal Anterior Cervical Nodes] : no anterior cervical lymphadenopathy [Muscle Spasms, Bilateral] : bilateral muscle spasms [No Joint Swelling] : no joint swelling [Motor Strength Lower Extremities Bilaterally] : there was weakness in both lower extremities [Normal] : no rash [Coordination Grossly Intact] : coordination grossly intact [No Focal Deficits] : no focal deficits [Normal Gait] : normal gait [Speech Grossly Normal] : speech grossly normal [Memory Grossly Normal] : memory grossly normal [Normal Affect] : the affect was normal [Alert and Oriented x3] : oriented to person, place, and time [Normal Mood] : the mood was normal [Normal Insight/Judgement] : insight and judgment were intact [de-identified] : mild LE weakness consistent with history

## 2022-07-19 NOTE — HISTORY OF PRESENT ILLNESS
[FreeTextEntry1] : Requests comprehensive exam [de-identified] : Presents for comprehensive exam.  States generally feeling well; trying to eat healthy and maintain activity.  Is somewhat restrained by known spinal stenosis; states current medication regime "has given me my life back."  Note pt is alert, conversant, with no evidence of intoxication or euphoria.  Reviewed screening and immunizations - order placed for updated colonoscopy.\par Reviewed all recent Neurology documentation.

## 2022-07-19 NOTE — COUNSELING
[de-identified] : Healthy eating and activities [None] : None [Good understanding] : Patient has a good understanding of lifestyle changes and steps needed to achieve self management goal

## 2022-07-19 NOTE — REVIEW OF SYSTEMS
[Muscle Weakness] : muscle weakness [Back Pain] : back pain [Negative] : Heme/Lymph [FreeTextEntry9] : consistent with history

## 2022-07-19 NOTE — HEALTH RISK ASSESSMENT
[Former] : Former [0-4] : 0-4 [Yes] : Yes [2 - 4 times a month (2 pts)] : 2-4 times a month (2 points) [1 or 2 (0 pts)] : 1 or 2 (0 points) [Never (0 pts)] : Never (0 points) [No] : In the past 12 months have you used drugs other than those required for medical reasons? No [No falls in past year] : Patient reported no falls in the past year [0] : 2) Feeling down, depressed, or hopeless: Not at all (0) [Fully functional (bathing, dressing, toileting, transferring, walking, feeding)] : Fully functional (bathing, dressing, toileting, transferring, walking, feeding) [Fully functional (using the telephone, shopping, preparing meals, housekeeping, doing laundry, using] : Fully functional and needs no help or supervision to perform IADLs (using the telephone, shopping, preparing meals, housekeeping, doing laundry, using transportation, managing medications and managing finances) [With Patient/Caregiver] : , with patient/caregiver [Reviewed no changes] : Reviewed, no changes [YearQuit] : 2002 [Audit-CScore] : 2 [AdvancecareDate] : 07/22

## 2022-07-19 NOTE — ASSESSMENT
[FreeTextEntry1] : Comprehensive exam reveals patient to be hemodynamically stable with acceptable BP\par Known spinal stenosis well managed with proper use of present medication\par Lab profiles drawn in office and sent

## 2022-07-20 LAB
ALBUMIN SERPL ELPH-MCNC: 4.4 G/DL
ALP BLD-CCNC: 101 U/L
ALT SERPL-CCNC: 22 U/L
ANION GAP SERPL CALC-SCNC: 12 MMOL/L
APPEARANCE: CLEAR
AST SERPL-CCNC: 24 U/L
BASOPHILS # BLD AUTO: 0.04 K/UL
BASOPHILS NFR BLD AUTO: 0.8 %
BILIRUB SERPL-MCNC: 0.5 MG/DL
BILIRUBIN URINE: NEGATIVE
BLOOD URINE: NEGATIVE
BUN SERPL-MCNC: 8 MG/DL
CALCIUM SERPL-MCNC: 8.8 MG/DL
CHLORIDE SERPL-SCNC: 103 MMOL/L
CHOLEST SERPL-MCNC: 169 MG/DL
CO2 SERPL-SCNC: 25 MMOL/L
COLOR: YELLOW
CREAT SERPL-MCNC: 0.81 MG/DL
EGFR: 98 ML/MIN/1.73M2
EOSINOPHIL # BLD AUTO: 0.11 K/UL
EOSINOPHIL NFR BLD AUTO: 2.1 %
ESTIMATED AVERAGE GLUCOSE: 120 MG/DL
FOLATE SERPL-MCNC: 16.8 NG/ML
GLUCOSE QUALITATIVE U: NEGATIVE
GLUCOSE SERPL-MCNC: 97 MG/DL
HBA1C MFR BLD HPLC: 5.8 %
HCT VFR BLD CALC: 42.1 %
HDLC SERPL-MCNC: 31 MG/DL
HGB BLD-MCNC: 13.9 G/DL
IMM GRANULOCYTES NFR BLD AUTO: 0.2 %
KETONES URINE: NEGATIVE
LDLC SERPL CALC-MCNC: 98 MG/DL
LEUKOCYTE ESTERASE URINE: NEGATIVE
LYMPHOCYTES # BLD AUTO: 1.86 K/UL
LYMPHOCYTES NFR BLD AUTO: 35.2 %
MAN DIFF?: NORMAL
MCHC RBC-ENTMCNC: 29.3 PG
MCHC RBC-ENTMCNC: 33 GM/DL
MCV RBC AUTO: 88.6 FL
MONOCYTES # BLD AUTO: 0.52 K/UL
MONOCYTES NFR BLD AUTO: 9.8 %
NEUTROPHILS # BLD AUTO: 2.74 K/UL
NEUTROPHILS NFR BLD AUTO: 51.9 %
NITRITE URINE: NEGATIVE
NONHDLC SERPL-MCNC: 138 MG/DL
PH URINE: 6
PLATELET # BLD AUTO: 238 K/UL
POTASSIUM SERPL-SCNC: 4.2 MMOL/L
PROT SERPL-MCNC: 7 G/DL
PROTEIN URINE: NORMAL
PSA SERPL-MCNC: 3.11 NG/ML
RBC # BLD: 4.75 M/UL
RBC # FLD: 13.4 %
SODIUM SERPL-SCNC: 140 MMOL/L
SPECIFIC GRAVITY URINE: 1.03
T4 FREE SERPL-MCNC: 1.2 NG/DL
TRIGL SERPL-MCNC: 201 MG/DL
TSH SERPL-ACNC: 2.15 UIU/ML
UROBILINOGEN URINE: ABNORMAL
VIT B12 SERPL-MCNC: >2000 PG/ML
WBC # FLD AUTO: 5.28 K/UL

## 2022-10-28 ENCOUNTER — APPOINTMENT (OUTPATIENT)
Dept: PSYCHIATRY | Facility: CLINIC | Age: 67
End: 2022-10-28

## 2022-10-28 PROCEDURE — 99205 OFFICE O/P NEW HI 60 MIN: CPT

## 2022-10-28 RX ORDER — QUETIAPINE FUMARATE 25 MG/1
25 TABLET ORAL
Qty: 90 | Refills: 0 | Status: DISCONTINUED | COMMUNITY
Start: 2022-08-28
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
## 2022-10-28 NOTE — PHYSICAL EXAM
[None] : none [Cooperative] : cooperative [Constricted] : constricted [Clear] : clear [Linear/Goal Directed] : linear/goal directed [Average] : average [WNL] : within normal limits [FreeTextEntry8] : "I am depressed."

## 2022-10-28 NOTE — PLAN
[FreeTextEntry4] : Assessment: Patient is a 66 yo male with h/o depression and anxiety seen today for medication management. Patient is compliant with his medications, tolerating it well without any side effects. I-STOP was checked without any problems.\par \par I-STOP:\par Patient Name: Matt Shay\par YOB: 1955\par Address: [address] [place] [address]\par Sex: Male\par Rx Written	Rx Dispensed	Drug	Quantity	Days Supply	Prescriber Name	Prescriber Elizabeth #	Payment Method	Dispenser\par 10/18/2022	10/22/2022	hydrocodone-acetaminophen  mg tablet	180	30	Panda Camp MD	[id number]	Medicare	[id number]\par 10/18/2022	10/20/2022	zolpidem tart er 12.5 mg tab	30	30	Panda Camp MD	[id number]	Medicare	[id number]\par 10/18/2022	10/20/2022	xtampza er 36 mg capsule	30	30	Panda Camp MD	[id number]	Medicare	[facility]\par 09/20/2022	09/22/2022	hydrocodone-acetaminophen  mg tablet	180	30	Panda Camp MD	[id number]	Medicare	[facility]\par 09/20/2022	09/21/2022	xtampza er 36 mg capsule	30	30	Panda Camp MD	[id number]	Medicare	[facility]\par 09/20/2022	09/21/2022	zolpidem tart er 12.5 mg tab	30	30	Panda Camp MD	[id number]	Medicare	[facility]\par 08/22/2022	08/22/2022	zolpidem tart er 12.5 mg tab	30	30	Panda Camp MD	[id number]	Medicare	[facility]\par 08/22/2022	08/22/2022	hydrocodone-acetaminophen  mg tablet	180	30	Panda Camp MD	[id number]	Medicare	[facility]\par 08/22/2022	08/22/2022	xtampza er 36 mg capsule	30	30	Panda Camp MD	[id number]	Medicare	[facility]\par 07/19/2022	07/23/2022	xtampza er 36 mg capsule	30	30	Panda Camp MD	[id number]	Medicare	[facility]\par 07/19/2022	07/23/2022	zolpidem tart er 12.5 mg tab	30	30	Panda Camp MD	[id number]	Medicare	[facility]\par 07/19/2022	07/23/2022	hydrocodone-acetaminophen  mg tablet	180	30	Panda Camp MD	[id number]	Medicare	[facility]\par 06/24/2022	06/24/2022	xtampza er 36 mg capsule	30	30	Panda Camp MD	[id number]	Medicare	[facility]\par 06/24/2022	06/24/2022	zolpidem tart er 12.5 mg tab	30	30	Panda Camp MD	[id number]	Medicare	[facility]\par 06/24/2022	06/24/2022	hydrocodone-acetaminophen  mg tablet	180	30	Panda Camp MD	[id number]	Medicare	[facility]\par 05/23/2022	05/26/2022	xtampza er 36 mg capsule	30	30	Panda Camp MD	[id number]	Medicare	[facility]\par 05/23/2022	05/26/2022	zolpidem tart er 12.5 mg tab	30	30	Panda Camp MD	[id number]	Medicare	[facility]\par 05/23/2022	05/26/2022	hydrocodone-acetaminophen  mg tablet	180	30	Panda Camp MD	[id number]	Medicare	[facility]\par 04/25/2022	04/26/2022	diazepam 10 mg tablet	30	30	Donovan Palma DO	[id number]	Medicare	[facility]\par 04/21/2022	04/26/2022	hydrocodone-acetaminophen  mg tablet	180	30	Panda Camp MD	[id number]	Medicare	[facility]\par 04/21/2022	04/26/2022	xtampza er 36 mg capsule	30	30	Panda Camp MD	[id number]	Medicare	[facility]\par 04/21/2022	04/26/2022	zolpidem tart er 12.5 mg tab	30	30	Panda Camp MD	[id number]	Medicare	[facility]\par 03/22/2022	03/28/2022	zolpidem tart er 12.5 mg tab	30	30	Panda Camp MD	[id number]	Medicare	[facility]\par 03/22/2022	03/25/2022	xtampza er 36 mg capsule	30	30	Panda Camp MD	[id number]	Medicare	[facility]\par 03/22/2022	03/25/2022	hydrocodone-acetaminophen  mg tablet	180	30	Panda Camp MD	[id number]	Medicare	[facility]\par 02/22/2022	02/26/2022	zolpidem tart er 12.5 mg tab	30	30	Panda Camp MD	[id number]	Medicare	[facility]\par 02/22/2022	02/24/2022	xtampza er 36 mg capsule	30	30	Panda Camp MD	[id number]	Medicare	[facility]\par 02/22/2022	02/24/2022	hydrocodone-acetaminophen  mg tablet	180	30	Panda Camp MD	[id number]	Medicare	[facility]\par 01/30/2022	02/04/2022	diazepam 10 mg tablet	30	30	Donovan Palma DO	[id number]	Medicare	[facility]\par 01/20/2022	01/24/2022	xtampza er 36 mg capsule	30	30	Panda Camp MD	[id number]	Medicare	[facility]\par 01/20/2022	01/24/2022	hydrocodone-acetaminophen  mg tablet	180	30	Panda Camp MD	[id number]	Medicare	[facility]\par 01/20/2022	01/24/2022	zolpidem tart er 12.5 mg tab	30	30	Panda Camp MD	[id number]	Medicare	[facility]\par 12/21/2021	12/24/2021	xtampza er 36 mg capsule	30	30	Panda Camp MD	[id number]	Medicare	[facility]\par 12/21/2021	12/24/2021	zolpidem tart er 12.5 mg tab	30	30	Panda Camp MD	[id number]	Medicare	[facility]\par 12/21/2021	12/24/2021	hydrocodone-acetaminophen  mg tablet	180	30	Panda Camp MD	[id number]	Medicare	[facility]\par 11/23/2021	11/24/2021	xtampza er 36 mg capsule	30	30	Panda Camp MD	[id number]	Medicare	[facility]\par 11/23/2021	11/24/2021	zolpidem tart er 12.5 mg tab	30	30	Panda Camp MD	[id number]	Medicare	[facility]\par 11/23/2021	11/24/2021	hydrocodone-acetaminophen  mg tablet	180	30	Panda Camp MD	[id number]	Medicare	[facility]\par \par \par PLAN:\par Start Wellbutrin  mg PO QAM for depression, low motivation, energy, concentration and decrease SSRI induced sexual dysfunction.\par Continue Duloxetine 60 gm PO QHS for depression, anxiety and neuropathic pain\par Continue Seroquel 25 mg PO QHS for insomnia\par Patient is also on Ambien, Vicodin, and Xtampzia from his PMD\par - Discussed risks and benefits of medications including side effects of GI and sexual with SSRI. Alternative strategies including no intervention discussed with patient. Patient consents to current medications as prescribed.\par - Discussed with patient regarding importance of abstinence and sobriety from alcohol and drugs. Educated about relationship between worsening mood/anxiety symptoms and drug use and improvement of symptoms with abstinence. \par - Discussed about unpredictable effects including cardiorespiratory collapse from the combination of illicit drugs and prescribed medications. Patient verbalized understanding.\par - Patient understands to contact clinic prn with concerns and agrees to call 911 or go to nearest ER if symptoms worsen.\par - Next appointment made in 1 month. Patient left the office without any distress.\par \par \par I spent a total of 60 minutes for today's visit in evaluating and treating the patient as per above, including: preparing for patient's appointment (review of prior documents, [facility] ), performing a medically necessary exam/evaluation, communicating/counseling/educating the patient ordering medications\par  \par \par

## 2022-11-22 ENCOUNTER — APPOINTMENT (OUTPATIENT)
Dept: FAMILY MEDICINE | Facility: CLINIC | Age: 67
End: 2022-11-22

## 2022-11-22 VITALS
HEART RATE: 76 BPM | SYSTOLIC BLOOD PRESSURE: 124 MMHG | HEIGHT: 70 IN | RESPIRATION RATE: 20 BRPM | DIASTOLIC BLOOD PRESSURE: 75 MMHG | BODY MASS INDEX: 27.63 KG/M2 | WEIGHT: 193 LBS

## 2022-11-22 PROCEDURE — 36415 COLL VENOUS BLD VENIPUNCTURE: CPT

## 2022-11-22 PROCEDURE — 99214 OFFICE O/P EST MOD 30 MIN: CPT | Mod: 25

## 2022-11-22 NOTE — REVIEW OF SYSTEMS
[Back Pain] : back pain [Insomnia] : insomnia [Negative] : Neurological [FreeTextEntry9] : consistent with history

## 2022-11-22 NOTE — HISTORY OF PRESENT ILLNESS
[de-identified] : Presents for BP check, labs, and general follow-up.  Reviewed medication and renewed as requested - note pt has known long-standing spinal stenosis - using all pain medication appropriately to maintain good daily functioning - trying to keep as active as possible - states "It's given me my life back."  Trying to eat healthy ("but I eat a lot of sugar").

## 2022-11-23 LAB
ALBUMIN SERPL ELPH-MCNC: 4.4 G/DL
ALP BLD-CCNC: 90 U/L
ALT SERPL-CCNC: 16 U/L
ANION GAP SERPL CALC-SCNC: 11 MMOL/L
AST SERPL-CCNC: 16 U/L
BASOPHILS # BLD AUTO: 0.03 K/UL
BASOPHILS NFR BLD AUTO: 0.5 %
BILIRUB SERPL-MCNC: 0.3 MG/DL
BUN SERPL-MCNC: 12 MG/DL
CALCIUM SERPL-MCNC: 9.1 MG/DL
CHLORIDE SERPL-SCNC: 101 MMOL/L
CHOLEST SERPL-MCNC: 188 MG/DL
CO2 SERPL-SCNC: 24 MMOL/L
CREAT SERPL-MCNC: 0.76 MG/DL
EGFR: 99 ML/MIN/1.73M2
EOSINOPHIL # BLD AUTO: 0.1 K/UL
EOSINOPHIL NFR BLD AUTO: 1.8 %
ESTIMATED AVERAGE GLUCOSE: 120 MG/DL
GLUCOSE SERPL-MCNC: 99 MG/DL
HBA1C MFR BLD HPLC: 5.8 %
HCT VFR BLD CALC: 41.5 %
HDLC SERPL-MCNC: 31 MG/DL
HGB BLD-MCNC: 13.4 G/DL
IMM GRANULOCYTES NFR BLD AUTO: 0.2 %
LDLC SERPL CALC-MCNC: 113 MG/DL
LYMPHOCYTES # BLD AUTO: 1.75 K/UL
LYMPHOCYTES NFR BLD AUTO: 31 %
MAN DIFF?: NORMAL
MCHC RBC-ENTMCNC: 28.2 PG
MCHC RBC-ENTMCNC: 32.3 GM/DL
MCV RBC AUTO: 87.2 FL
MONOCYTES # BLD AUTO: 0.51 K/UL
MONOCYTES NFR BLD AUTO: 9 %
NEUTROPHILS # BLD AUTO: 3.25 K/UL
NEUTROPHILS NFR BLD AUTO: 57.5 %
NONHDLC SERPL-MCNC: 158 MG/DL
PLATELET # BLD AUTO: 253 K/UL
POTASSIUM SERPL-SCNC: 4.5 MMOL/L
PROT SERPL-MCNC: 7 G/DL
PSA SERPL-MCNC: 4.57 NG/ML
RBC # BLD: 4.76 M/UL
RBC # FLD: 13.7 %
SODIUM SERPL-SCNC: 136 MMOL/L
TRIGL SERPL-MCNC: 225 MG/DL
WBC # FLD AUTO: 5.65 K/UL

## 2022-12-14 ENCOUNTER — APPOINTMENT (OUTPATIENT)
Dept: PSYCHIATRY | Facility: CLINIC | Age: 67
End: 2022-12-14

## 2022-12-14 PROCEDURE — 99214 OFFICE O/P EST MOD 30 MIN: CPT

## 2022-12-14 NOTE — PLAN
[FreeTextEntry4] : Assessment: Patient is a 66 yo male with h/o depression and anxiety seen today for medication management. Patient is compliant with his medications, tolerating it well without any side effects. I-STOP was checked without any problems.\par \par I-STOP:\par Patient Name: Matt Shay\par YOB: 1955\par Address: [address] [place] [address]\par Sex: Male\par Rx Written	Rx Dispensed	Drug	Quantity	Days Supply	Prescriber Name	Prescriber Elizabeth #	Payment Method	Dispenser\par 10/18/2022	10/22/2022	hydrocodone-acetaminophen  mg tablet	180	30	Panda Camp MD	[id number]	Medicare	[facility]\par 10/18/2022	10/20/2022	zolpidem tart er 12.5 mg tab	30	30	Panda Camp MD	[id number]	Medicare	[facility]\par 10/18/2022	10/20/2022	xtampza er 36 mg capsule	30	30	Panda Camp MD	[id number]	Medicare	[facility]\par 09/20/2022	09/22/2022	hydrocodone-acetaminophen  mg tablet	180	30	Panda Camp MD	[id number]	Medicare	[facility]\par 09/20/2022	09/21/2022	xtampza er 36 mg capsule	30	30	Panda Camp MD	[id number]	Medicare	[facility]\par 09/20/2022	09/21/2022	zolpidem tart er 12.5 mg tab	30	30	Panda Camp MD	[id number]	Medicare	[facility]\par 08/22/2022	08/22/2022	zolpidem tart er 12.5 mg tab	30	30	Panda Camp MD	[id number]	Medicare	[facility]\par 08/22/2022	08/22/2022	hydrocodone-acetaminophen  mg tablet	180	30	Panda Camp MD	[id number]	Medicare	[facility]\par 08/22/2022	08/22/2022	xtampza er 36 mg capsule	30	30	Panda Camp MD	[id number]	Medicare	[facility]\par 07/19/2022	07/23/2022	xtampza er 36 mg capsule	30	30	Panda Camp MD	[id number]	Medicare	[facility]\par 07/19/2022	07/23/2022	zolpidem tart er 12.5 mg tab	30	30	Panda Camp MD	[id number]	Medicare	[facility]\par 07/19/2022	07/23/2022	hydrocodone-acetaminophen  mg tablet	180	30	Panda Camp MD	[id number]	Medicare	[facility]\par 06/24/2022	06/24/2022	xtampza er 36 mg capsule	30	30	Panda Camp MD	[id number]	Medicare	[facility]\par 06/24/2022	06/24/2022	zolpidem tart er 12.5 mg tab	30	30	Panda Camp MD	[id number]	Medicare	[facility]\par 06/24/2022	06/24/2022	hydrocodone-acetaminophen  mg tablet	180	30	Panda Camp MD	[id number]	Medicare	[facility]\par 05/23/2022	05/26/2022	xtampza er 36 mg capsule	30	30	Panda Camp MD	[id number]	Medicare	[facility]\par 05/23/2022	05/26/2022	zolpidem tart er 12.5 mg tab	30	30	Panda Camp MD	[id number]	Medicare	[facility]\par 05/23/2022	05/26/2022	hydrocodone-acetaminophen  mg tablet	180	30	Panda Camp MD	[id number]	Medicare	[facility]\par 04/25/2022	04/26/2022	diazepam 10 mg tablet	30	30	Donovan Palma DO	[id number]	Medicare	[facility]\par 04/21/2022	04/26/2022	hydrocodone-acetaminophen  mg tablet	180	30	Panda Camp MD	[id number]	Medicare	[facility]\par 04/21/2022	04/26/2022	xtampza er 36 mg capsule	30	30	Panda Camp MD	[id number]	Medicare	[facility]\par 04/21/2022	04/26/2022	zolpidem tart er 12.5 mg tab	30	30	Panda Camp MD	[id number]	Medicare	[facility]\par 03/22/2022	03/28/2022	zolpidem tart er 12.5 mg tab	30	30	Panda Camp MD	[id number]	Medicare	[facility]\par 03/22/2022	03/25/2022	xtampza er 36 mg capsule	30	30	Panda Camp MD	[id number]	Medicare	[facility]\par 03/22/2022	03/25/2022	hydrocodone-acetaminophen  mg tablet	180	30	Panda Camp MD	[id number]	Medicare	[facility]\par 02/22/2022	02/26/2022	zolpidem tart er 12.5 mg tab	30	30	Panda Camp MD	[id number]	Medicare	[facility]\par 02/22/2022	02/24/2022	xtampza er 36 mg capsule	30	30	Panda Camp MD	[id number]	Medicare	[facility]\par 02/22/2022	02/24/2022	hydrocodone-acetaminophen  mg tablet	180	30	Panda Camp MD	[id number]	Medicare	[facility]\par 01/30/2022	02/04/2022	diazepam 10 mg tablet	30	30	Donovan Palma DO	[id number]	Medicare	[facility]\par 01/20/2022	01/24/2022	xtampza er 36 mg capsule	30	30	Panda Camp MD	[id number]	Medicare	[facility]\par 01/20/2022	01/24/2022	hydrocodone-acetaminophen  mg tablet	180	30	Panda Camp MD	[id number]	Medicare	[facility]\par 01/20/2022	01/24/2022	zolpidem tart er 12.5 mg tab	30	30	Panda Camp MD	[id number]	Medicare	[facility]\par 12/21/2021	12/24/2021	xtampza er 36 mg capsule	30	30	Panda Camp MD	[id number]	Medicare	[facility]\par 12/21/2021	12/24/2021	zolpidem tart er 12.5 mg tab	30	30	Panda Camp MD	[id number]	Medicare	[facility]\par 12/21/2021	12/24/2021	hydrocodone-acetaminophen  mg tablet	180	30	Panda Camp MD	[id number]	Medicare	[facility]\par 11/23/2021	11/24/2021	xtampza er 36 mg capsule	30	30	Panda Camp MD	[id number]	Medicare	[facility]\par 11/23/2021	11/24/2021	zolpidem tart er 12.5 mg tab	30	30	Panda Camp MD	[id number]	Medicare	[facility]\par 11/23/2021	11/24/2021	hydrocodone-acetaminophen  mg tablet	180	30	Panda Camp MD	[id number]	Medicare	[facility]\par \par \par PLAN:\par Increase Wellbutrin  to 300 mg PO QAM for depression, low motivation, energy, concentration and decrease SSRI induced sexual dysfunction.\par Continue Duloxetine 60 gm PO QHS for depression, anxiety and neuropathic pain\par Continue Seroquel 25 mg PO QHS for insomnia\par Patient is also on Ambien, Vicodin, and Xtampzia from his PMD\par - Discussed risks and benefits of medications including side effects of GI and sexual with SSRI. Alternative strategies including no intervention discussed with patient. Patient consents to current medications as prescribed.\par - Discussed with patient regarding importance of abstinence and sobriety from alcohol and drugs. Educated about relationship between worsening mood/anxiety symptoms and drug use and improvement of symptoms with abstinence. \par - Discussed about unpredictable effects including cardiorespiratory collapse from the combination of illicit drugs and prescribed medications. Patient verbalized understanding.\par - Patient understands to contact clinic prn with concerns and agrees to call 911 or go to nearest ER if symptoms worsen.\par - Next appointment made in 1 month. Patient left the office without any distress.\par \par \par I spent a total of 60 minutes for today's visit in evaluating and treating the patient as per above, including: preparing for patient's appointment (review of prior documents, [facility] ), performing a medically necessary exam/evaluation, communicating/counseling/educating the patient ordering medications\par  \par \par
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
## 2023-01-18 ENCOUNTER — APPOINTMENT (OUTPATIENT)
Dept: PSYCHIATRY | Facility: CLINIC | Age: 68
End: 2023-01-18
Payer: MEDICARE

## 2023-01-18 PROCEDURE — 99214 OFFICE O/P EST MOD 30 MIN: CPT
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
## 2023-01-18 NOTE — PLAN
[FreeTextEntry4] : Assessment: Patient is a 66 yo male with h/o depression and anxiety seen today for medication management. Patient is compliant with his medications, tolerating it well without any side effects. I-STOP was checked without any problems.\par \par I-STOP:\par Patient Name: Matt Shay\par YOB: 1955\par Address: [address] [place] [address]\par Sex: Male\par Rx Written	Rx Dispensed	Drug	Quantity	Days Supply	Prescriber Name	Prescriber Elizabeth #	Payment Method	Dispenser\par 10/18/2022	10/22/2022	hydrocodone-acetaminophen  mg tablet	180	30	Panda Camp MD	[id number]	Medicare	[id number]\par 10/18/2022	10/20/2022	zolpidem tart er 12.5 mg tab	30	30	Panda Camp MD	[id number]	Medicare	[facility]\par 10/18/2022	10/20/2022	xtampza er 36 mg capsule	30	30	Panda Camp MD	[id number]	Medicare	[facility]\par 09/20/2022	09/22/2022	hydrocodone-acetaminophen  mg tablet	180	30	Pnada Camp MD	[id number]	Medicare	[facility]\par 09/20/2022	09/21/2022	xtampza er 36 mg capsule	30	30	Panda Camp MD	[id number]	Medicare	[facility]\par 09/20/2022	09/21/2022	zolpidem tart er 12.5 mg tab	30	30	Panda Camp MD	[id number]	Medicare	[facility]\par 08/22/2022	08/22/2022	zolpidem tart er 12.5 mg tab	30	30	Panda Camp MD	[id number]	Medicare	[facility]\par 08/22/2022	08/22/2022	hydrocodone-acetaminophen  mg tablet	180	30	Panda Camp MD	[id number]	Medicare	[facility]\par 08/22/2022	08/22/2022	xtampza er 36 mg capsule	30	30	Panda Camp MD	[id number]	Medicare	[facility]\par 07/19/2022	07/23/2022	xtampza er 36 mg capsule	30	30	Panda Camp MD	[id number]	Medicare	[facility]\par 07/19/2022	07/23/2022	zolpidem tart er 12.5 mg tab	30	30	Panda Camp MD	[id number]	Medicare	[facility]\par 07/19/2022	07/23/2022	hydrocodone-acetaminophen  mg tablet	180	30	Panda Camp MD	[id number]	Medicare	[facility]\par 06/24/2022	06/24/2022	xtampza er 36 mg capsule	30	30	Panda Camp MD	[id number]	Medicare	[facility]\par 06/24/2022	06/24/2022	zolpidem tart er 12.5 mg tab	30	30	Panda Camp MD	[id number]	Medicare	[facility]\par 06/24/2022	06/24/2022	hydrocodone-acetaminophen  mg tablet	180	30	Panda Camp MD	[id number]	Medicare	[facility]\par 05/23/2022	05/26/2022	xtampza er 36 mg capsule	30	30	Panda Camp MD	[id number]	Medicare	[facility]\par 05/23/2022	05/26/2022	zolpidem tart er 12.5 mg tab	30	30	Panda Camp MD	[id number]	Medicare	[facility]\par 05/23/2022	05/26/2022	hydrocodone-acetaminophen  mg tablet	180	30	Panda Camp MD	[id number]	Medicare	[facility]\par 04/25/2022	04/26/2022	diazepam 10 mg tablet	30	30	Donovan Palma DO	[id number]	Medicare	[facility]\par 04/21/2022	04/26/2022	hydrocodone-acetaminophen  mg tablet	180	30	Panda Camp MD	[id number]	Medicare	[facility]\par 04/21/2022	04/26/2022	xtampza er 36 mg capsule	30	30	Panda Camp MD	[id number]	Medicare	[facility]\par 04/21/2022	04/26/2022	zolpidem tart er 12.5 mg tab	30	30	Panda Camp MD	[id number]	Medicare	[facility]\par 03/22/2022	03/28/2022	zolpidem tart er 12.5 mg tab	30	30	Panda Camp MD	[id number]	Medicare	[facility]\par 03/22/2022	03/25/2022	xtampza er 36 mg capsule	30	30	Panda Camp MD	[id number]	Medicare	[facility]\par 03/22/2022	03/25/2022	hydrocodone-acetaminophen  mg tablet	180	30	Panda Camp MD	[id number]	Medicare	[facility]\par 02/22/2022	02/26/2022	zolpidem tart er 12.5 mg tab	30	30	Panda Camp MD	[id number]	Medicare	[facility]\par 02/22/2022	02/24/2022	xtampza er 36 mg capsule	30	30	Panda Camp MD	[id number]	Medicare	[facility]\par 02/22/2022	02/24/2022	hydrocodone-acetaminophen  mg tablet	180	30	Panda Camp MD	[id number]	Medicare	[facility]\par 01/30/2022	02/04/2022	diazepam 10 mg tablet	30	30	Donovan Palma DO	[id number]	Medicare	[facility]\par 01/20/2022	01/24/2022	xtampza er 36 mg capsule	30	30	Panda Camp MD	[id number]	Medicare	[facility]\par 01/20/2022	01/24/2022	hydrocodone-acetaminophen  mg tablet	180	30	Panda Camp MD	[id number]	Medicare	[facility]\par 01/20/2022	01/24/2022	zolpidem tart er 12.5 mg tab	30	30	Panda Camp MD	[id number]	Medicare	[facility]\par 12/21/2021	12/24/2021	xtampza er 36 mg capsule	30	30	Panda Camp MD	[id number]	Medicare	[facility]\par 12/21/2021	12/24/2021	zolpidem tart er 12.5 mg tab	30	30	Panda Camp MD	[id number]	Medicare	[facility]\par 12/21/2021	12/24/2021	hydrocodone-acetaminophen  mg tablet	180	30	Panda Camp MD	[id number]	Medicare	[facility]\par 11/23/2021	11/24/2021	xtampza er 36 mg capsule	30	30	Panda Camp MD	[id number]	Medicare	[facility]\par 11/23/2021	11/24/2021	zolpidem tart er 12.5 mg tab	30	30	Panda Camp MD	[id number]	Medicare	[facility]\par 11/23/2021	11/24/2021	hydrocodone-acetaminophen  mg tablet	180	30	Panda Camp MD	[id number]	Medicare	[facility]\par \par \par PLAN:\par Continue Wellbutrin  mg PO QAM for depression, low motivation, energy, concentration and decrease SSRI induced sexual dysfunction.\par Increase Duloxetine 60 to 120 mg PO QHS for depression, anxiety and neuropathic pain\par Continue Seroquel 25 mg PO QHS for insomnia\par Patient is also on Ambien, Vicodin, and Xtampzia from his PMD\par - Discussed risks and benefits of medications including side effects of GI and sexual with SSRI. Alternative strategies including no intervention discussed with patient. Patient consents to current medications as prescribed.\par - Discussed with patient regarding importance of abstinence and sobriety from alcohol and drugs. Educated about relationship between worsening mood/anxiety symptoms and drug use and improvement of symptoms with abstinence. \par - Discussed about unpredictable effects including cardiorespiratory collapse from the combination of illicit drugs and prescribed medications. Patient verbalized understanding.\par - Patient understands to contact clinic prn with concerns and agrees to call 911 or go to nearest ER if symptoms worsen.\par - Next appointment made in 1 month. Patient left the office without any distress.\par \par
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
## 2023-02-16 RX ORDER — OXYCODONE HYDROCHLORIDE 20 MG/1
20 TABLET, FILM COATED, EXTENDED RELEASE ORAL DAILY
Qty: 30 | Refills: 0 | Status: DISCONTINUED | COMMUNITY
Start: 2019-09-16 | End: 2023-02-16

## 2023-02-21 ENCOUNTER — APPOINTMENT (OUTPATIENT)
Dept: PSYCHIATRY | Facility: CLINIC | Age: 68
End: 2023-02-21
Payer: MEDICARE

## 2023-02-21 PROCEDURE — 99214 OFFICE O/P EST MOD 30 MIN: CPT

## 2023-02-21 NOTE — HISTORY OF PRESENT ILLNESS
[FreeTextEntry1] : Patient is here in the office for face to face interview with writer for 1 month follow-up visit.\par \par Last month Duloxetine was increased from 60 to 120 mg. Patient states mind is all negative . States she has a couple of stressors. States during the summer of 2022. States he is dealing with some legal problems (got 5 tickets). Wife is a  and he has so many dogs in the house as a result and the barking noise gets to him. Daughter and son in law has moved in the house. States prior to daughter and son in law moving in he was happy he was helping out with the dogs, washing and feeding them. "I felt i had some purpose and was busy and energetic. Now i don’t have that feeling anymore and i am just very negative." \par \par Mood: depressed. "I am negative. I just lay in bed my and i feel i am looking at myself in depression." States he is feeling lazy and sluggish. He is a hoarder. Does not feel like the medication is not working or helping him. States he was feeling dizzy this month. \par Sleeping: Sleeps with Ambien 10, Seroquel 25, Vicodin. \par Appetite: decrease with the Wellbutrin. has delayed hunger reaction\par Energy: decreased\par Concentration is decreased because he tends to forget a lot. \par Motivation is decreased\par Denies any AVH, SI or HI. \par Patient is also on Ambien, Vicodin, and Xtampzia from his PMD\par Sees  Beto Garcia  Left message for him to call me back. +HIPAA\par \par

## 2023-02-21 NOTE — PLAN
[FreeTextEntry4] : Assessment: Patient is a 66 yo male with h/o depression and anxiety seen today for medication management. Patient is compliant with his medications, tolerating it well without any side effects. I-STOP was checked without any problems.\par \par I-STOP:\par Patient Name: Matt Shay\par YOB: 1955\par Address: [address] [place] [address]\par Sex: Male\par Rx Written	Rx Dispensed	Drug	Quantity	Days Supply	Prescriber Name	Prescriber Elizabeth #	Payment Method	Dispenser\par 10/18/2022	10/22/2022	hydrocodone-acetaminophen  mg tablet	180	30	Panda Camp MD	[id number]	Medicare	[facility]\par 10/18/2022	10/20/2022	zolpidem tart er 12.5 mg tab	30	30	Panda Camp MD	[id number]	Medicare	[facility]\par 10/18/2022	10/20/2022	xtampza er 36 mg capsule	30	30	Panda Camp MD	[id number]	Medicare	[facility]\par 09/20/2022	09/22/2022	hydrocodone-acetaminophen  mg tablet	180	30	Panda Camp MD	[id number]	Medicare	[facility]\par 09/20/2022	09/21/2022	xtampza er 36 mg capsule	30	30	Panda Camp MD	[id number]	Medicare	[facility]\par 09/20/2022	09/21/2022	zolpidem tart er 12.5 mg tab	30	30	Panda Camp MD	[id number]	Medicare	[facility]\par 08/22/2022	08/22/2022	zolpidem tart er 12.5 mg tab	30	30	Panda Camp MD	[id number]	Medicare	[facility]\par 08/22/2022	08/22/2022	hydrocodone-acetaminophen  mg tablet	180	30	Panda Camp MD	[id number]	Medicare	[facility]\par 08/22/2022	08/22/2022	xtampza er 36 mg capsule	30	30	Panda Camp MD	[id number]	Medicare	[facility]\par 07/19/2022	07/23/2022	xtampza er 36 mg capsule	30	30	Panda Camp MD	[id number]	Medicare	[facility]\par 07/19/2022	07/23/2022	zolpidem tart er 12.5 mg tab	30	30	Panda Camp MD	[id number]	Medicare	[facility]\par 07/19/2022	07/23/2022	hydrocodone-acetaminophen  mg tablet	180	30	Panda Camp MD	[id number]	Medicare	[facility]\par 06/24/2022	06/24/2022	xtampza er 36 mg capsule	30	30	Panda Camp MD	[id number]	Medicare	[facility]\par 06/24/2022	06/24/2022	zolpidem tart er 12.5 mg tab	30	30	Panda Camp MD	[id number]	Medicare	[facility]\par 06/24/2022	06/24/2022	hydrocodone-acetaminophen  mg tablet	180	30	Panda Camp MD	[id number]	Medicare	[facility]\par 05/23/2022	05/26/2022	xtampza er 36 mg capsule	30	30	Panda Camp MD	[id number]	Medicare	[facility]\par 05/23/2022	05/26/2022	zolpidem tart er 12.5 mg tab	30	30	Panda Camp MD	[id number]	Medicare	[facility]\par 05/23/2022	05/26/2022	hydrocodone-acetaminophen  mg tablet	180	30	Panda Camp MD	[id number]	Medicare	[facility]\par 04/25/2022	04/26/2022	diazepam 10 mg tablet	30	30	Donovan Palma DO	[id number]	Medicare	[facility]\par 04/21/2022	04/26/2022	hydrocodone-acetaminophen  mg tablet	180	30	Panda Camp MD	[id number]	Medicare	[facility]\par 04/21/2022	04/26/2022	xtampza er 36 mg capsule	30	30	Panda Camp MD	[id number]	Medicare	[facility]\par 04/21/2022	04/26/2022	zolpidem tart er 12.5 mg tab	30	30	Panda Camp MD	[id number]	Medicare	[facility]\par 03/22/2022	03/28/2022	zolpidem tart er 12.5 mg tab	30	30	Panda Camp MD	[id number]	Medicare	[facility]\par 03/22/2022	03/25/2022	xtampza er 36 mg capsule	30	30	Panda Camp MD	[id number]	Medicare	[facility]\par 03/22/2022	03/25/2022	hydrocodone-acetaminophen  mg tablet	180	30	Panda Camp MD	[id number]	Medicare	[facility]\par 02/22/2022	02/26/2022	zolpidem tart er 12.5 mg tab	30	30	Panda Camp MD	[id number]	Medicare	[facility]\par 02/22/2022	02/24/2022	xtampza er 36 mg capsule	30	30	Panda Camp MD	[id number]	Medicare	[facility]\par 02/22/2022	02/24/2022	hydrocodone-acetaminophen  mg tablet	180	30	Panda Camp MD	[id number]	Medicare	[facility]\par 01/30/2022	02/04/2022	diazepam 10 mg tablet	30	30	Donovan Palma DO	[id number]	Medicare	[facility]\par 01/20/2022	01/24/2022	xtampza er 36 mg capsule	30	30	Panda Camp MD	[id number]	Medicare	[facility]\par 01/20/2022	01/24/2022	hydrocodone-acetaminophen  mg tablet	180	30	Panda Camp MD	[id number]	Medicare	[facility]\par 01/20/2022	01/24/2022	zolpidem tart er 12.5 mg tab	30	30	Panda Camp MD	[id number]	Medicare	[facility]\par 12/21/2021	12/24/2021	xtampza er 36 mg capsule	30	30	Panda Camp MD	[id number]	Medicare	[facility]\par 12/21/2021	12/24/2021	zolpidem tart er 12.5 mg tab	30	30	Panda Camp MD	[id number]	Medicare	[facility]\par 12/21/2021	12/24/2021	hydrocodone-acetaminophen  mg tablet	180	30	Panda Camp MD	[id number]	Medicare	[facility]\par 11/23/2021	11/24/2021	xtampza er 36 mg capsule	30	30	Panda Camp MD	[id number]	Medicare	[facility]\par 11/23/2021	11/24/2021	zolpidem tart er 12.5 mg tab	30	30	Panda Camp MD	[id number]	Medicare	[facility]\par 11/23/2021	11/24/2021	hydrocodone-acetaminophen  mg tablet	180	30	Panda Camp MD	[id number]	Medicare	[facility]\par \par \par PLAN:\par Continue Wellbutrin  mg PO QAM for depression, low motivation, energy, concentration and decrease SSRI induced sexual dysfunction.\par Continue Duloxetine 120 mg PO QHS for depression, anxiety and neuropathic pain\par Continue Seroquel 25 mg PO QHS for insomnia\par Patient is also on Ambien, Vicodin, and Xtampzia from his PMD\par - Discussed risks and benefits of medications including side effects of GI and sexual with SSRI. Alternative strategies including no intervention discussed with patient. Patient consents to current medications as prescribed.\par - Discussed with patient regarding importance of abstinence and sobriety from alcohol and drugs. Educated about relationship between worsening mood/anxiety symptoms and drug use and improvement of symptoms with abstinence. \par - Discussed about unpredictable effects including cardiorespiratory collapse from the combination of illicit drugs and prescribed medications. Patient verbalized understanding.\par - Patient understands to contact clinic prn with concerns and agrees to call 911 or go to nearest ER if symptoms worsen.\par - Next appointment made in 1 month. Patient left the office without any distress.\par \par

## 2023-03-16 ENCOUNTER — APPOINTMENT (OUTPATIENT)
Dept: FAMILY MEDICINE | Facility: CLINIC | Age: 68
End: 2023-03-16
Payer: MEDICARE

## 2023-03-16 VITALS
WEIGHT: 192 LBS | BODY MASS INDEX: 27.49 KG/M2 | HEART RATE: 76 BPM | SYSTOLIC BLOOD PRESSURE: 120 MMHG | HEIGHT: 70 IN | RESPIRATION RATE: 20 BRPM | DIASTOLIC BLOOD PRESSURE: 75 MMHG

## 2023-03-16 PROCEDURE — 36415 COLL VENOUS BLD VENIPUNCTURE: CPT

## 2023-03-16 PROCEDURE — 99214 OFFICE O/P EST MOD 30 MIN: CPT | Mod: 25

## 2023-03-16 NOTE — ASSESSMENT
[FreeTextEntry1] : Hemodynamically stable with acceptable BP\par Lab profiles drawn in office and sent\par Has not yet gone for F/U colonoscopy (see visit of 11/22/22) - updated order placed

## 2023-03-16 NOTE — HISTORY OF PRESENT ILLNESS
[de-identified] : Presents for BP check, labs, and general follow-up.  Reviewed medication - known spinal stenosis well managed with proper use of current medication.  Seeing Behavioral Health for ongoing issues with depression (note exacerbated by recent problem with 's license).  Otherwise trying to "eat less" and keep active.

## 2023-03-16 NOTE — PHYSICAL EXAM
[No Acute Distress] : no acute distress [Normal] : normal rate, regular rhythm, normal S1 and S2 and no murmur heard [No Edema] : there was no peripheral edema [Soft] : abdomen soft [Non Tender] : non-tender [Normal Posterior Cervical Nodes] : no posterior cervical lymphadenopathy [Normal Anterior Cervical Nodes] : no anterior cervical lymphadenopathy [Muscle Spasms, Bilateral] : bilateral muscle spasms [No Focal Deficits] : no focal deficits [Alert and Oriented x3] : oriented to person, place, and time

## 2023-03-17 LAB
ALBUMIN SERPL ELPH-MCNC: 4.7 G/DL
ALP BLD-CCNC: 88 U/L
ALT SERPL-CCNC: 26 U/L
ANION GAP SERPL CALC-SCNC: 13 MMOL/L
AST SERPL-CCNC: 23 U/L
BASOPHILS # BLD AUTO: 0.04 K/UL
BASOPHILS NFR BLD AUTO: 0.7 %
BILIRUB SERPL-MCNC: 0.3 MG/DL
BUN SERPL-MCNC: 9 MG/DL
CALCIUM SERPL-MCNC: 9.1 MG/DL
CHLORIDE SERPL-SCNC: 101 MMOL/L
CHOLEST SERPL-MCNC: 183 MG/DL
CO2 SERPL-SCNC: 24 MMOL/L
CREAT SERPL-MCNC: 0.8 MG/DL
EGFR: 98 ML/MIN/1.73M2
EOSINOPHIL # BLD AUTO: 0.1 K/UL
EOSINOPHIL NFR BLD AUTO: 1.6 %
ESTIMATED AVERAGE GLUCOSE: 126 MG/DL
GLUCOSE SERPL-MCNC: 111 MG/DL
HBA1C MFR BLD HPLC: 6 %
HCT VFR BLD CALC: 40.3 %
HDLC SERPL-MCNC: 30 MG/DL
HGB BLD-MCNC: 13.3 G/DL
IMM GRANULOCYTES NFR BLD AUTO: 0.2 %
LDLC SERPL CALC-MCNC: 119 MG/DL
LYMPHOCYTES # BLD AUTO: 1.87 K/UL
LYMPHOCYTES NFR BLD AUTO: 30.7 %
MAN DIFF?: NORMAL
MCHC RBC-ENTMCNC: 28.1 PG
MCHC RBC-ENTMCNC: 33 GM/DL
MCV RBC AUTO: 85.2 FL
MONOCYTES # BLD AUTO: 0.57 K/UL
MONOCYTES NFR BLD AUTO: 9.3 %
NEUTROPHILS # BLD AUTO: 3.51 K/UL
NEUTROPHILS NFR BLD AUTO: 57.5 %
NONHDLC SERPL-MCNC: 153 MG/DL
PLATELET # BLD AUTO: 279 K/UL
POTASSIUM SERPL-SCNC: 4.6 MMOL/L
PROT SERPL-MCNC: 7.1 G/DL
PSA SERPL-MCNC: 11.3 NG/ML
RBC # BLD: 4.73 M/UL
RBC # FLD: 13.6 %
SODIUM SERPL-SCNC: 138 MMOL/L
TRIGL SERPL-MCNC: 169 MG/DL
WBC # FLD AUTO: 6.1 K/UL

## 2023-07-18 ENCOUNTER — APPOINTMENT (OUTPATIENT)
Dept: FAMILY MEDICINE | Facility: CLINIC | Age: 68
End: 2023-07-18
Payer: MEDICARE

## 2023-07-18 VITALS
RESPIRATION RATE: 20 BRPM | SYSTOLIC BLOOD PRESSURE: 126 MMHG | DIASTOLIC BLOOD PRESSURE: 78 MMHG | HEIGHT: 70 IN | BODY MASS INDEX: 27.2 KG/M2 | WEIGHT: 190 LBS | HEART RATE: 76 BPM

## 2023-07-18 PROCEDURE — 99214 OFFICE O/P EST MOD 30 MIN: CPT | Mod: 25

## 2023-07-18 PROCEDURE — 36415 COLL VENOUS BLD VENIPUNCTURE: CPT

## 2023-07-18 NOTE — HISTORY OF PRESENT ILLNESS
[de-identified] : Presents for BP check, labs, and general follow-up.  Reviewed medication and renewed as requested - using all medication for known symptomatic spinal stenosis appropriately to maintain good daily functioning with no adverse events.  Trying to maintain a healthy diet.

## 2023-07-19 LAB
ALBUMIN SERPL ELPH-MCNC: 4.9 G/DL
ALP BLD-CCNC: 94 U/L
ALT SERPL-CCNC: 15 U/L
ANION GAP SERPL CALC-SCNC: 13 MMOL/L
AST SERPL-CCNC: 17 U/L
BILIRUB SERPL-MCNC: 0.2 MG/DL
BUN SERPL-MCNC: 13 MG/DL
CALCIUM SERPL-MCNC: 9.3 MG/DL
CHLORIDE SERPL-SCNC: 102 MMOL/L
CHOLEST SERPL-MCNC: 181 MG/DL
CO2 SERPL-SCNC: 24 MMOL/L
CREAT SERPL-MCNC: 0.74 MG/DL
EGFR: 100 ML/MIN/1.73M2
ESTIMATED AVERAGE GLUCOSE: 120 MG/DL
GLUCOSE SERPL-MCNC: 111 MG/DL
HBA1C MFR BLD HPLC: 5.8 %
HDLC SERPL-MCNC: 35 MG/DL
LDLC SERPL CALC-MCNC: 122 MG/DL
NONHDLC SERPL-MCNC: 146 MG/DL
POTASSIUM SERPL-SCNC: 4.4 MMOL/L
PROT SERPL-MCNC: 7.4 G/DL
PSA SERPL-MCNC: 3.02 NG/ML
SODIUM SERPL-SCNC: 139 MMOL/L
TRIGL SERPL-MCNC: 132 MG/DL
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
## 2023-09-14 ENCOUNTER — NON-APPOINTMENT (OUTPATIENT)
Age: 68
End: 2023-09-14

## 2023-10-13 ENCOUNTER — NON-APPOINTMENT (OUTPATIENT)
Age: 68
End: 2023-10-13

## 2023-10-13 RX ORDER — OMEGA-3-ACID ETHYL ESTERS CAPSULES 1 G/1
1 CAPSULE, LIQUID FILLED ORAL TWICE DAILY
Qty: 360 | Refills: 3 | Status: ACTIVE | COMMUNITY
Start: 2017-03-06 | End: 1900-01-01

## 2023-11-03 ENCOUNTER — RX RENEWAL (OUTPATIENT)
Age: 68
End: 2023-11-03

## 2023-11-14 ENCOUNTER — APPOINTMENT (OUTPATIENT)
Dept: FAMILY MEDICINE | Facility: CLINIC | Age: 68
End: 2023-11-14

## 2023-11-14 ENCOUNTER — NON-APPOINTMENT (OUTPATIENT)
Age: 68
End: 2023-11-14

## 2023-12-04 ENCOUNTER — APPOINTMENT (OUTPATIENT)
Dept: FAMILY MEDICINE | Facility: CLINIC | Age: 68
End: 2023-12-04
Payer: MEDICARE

## 2023-12-04 VITALS
WEIGHT: 195 LBS | BODY MASS INDEX: 27.92 KG/M2 | DIASTOLIC BLOOD PRESSURE: 75 MMHG | SYSTOLIC BLOOD PRESSURE: 126 MMHG | HEIGHT: 70 IN | RESPIRATION RATE: 20 BRPM | HEART RATE: 68 BPM

## 2023-12-04 PROCEDURE — 99214 OFFICE O/P EST MOD 30 MIN: CPT | Mod: 25

## 2023-12-04 PROCEDURE — 36415 COLL VENOUS BLD VENIPUNCTURE: CPT

## 2023-12-05 LAB
ALBUMIN SERPL ELPH-MCNC: 5 G/DL
ALP BLD-CCNC: 91 U/L
ALT SERPL-CCNC: 27 U/L
ANION GAP SERPL CALC-SCNC: 12 MMOL/L
AST SERPL-CCNC: 23 U/L
BILIRUB SERPL-MCNC: 0.3 MG/DL
BUN SERPL-MCNC: 10 MG/DL
CALCIUM SERPL-MCNC: 9.2 MG/DL
CHLORIDE SERPL-SCNC: 101 MMOL/L
CHOLEST SERPL-MCNC: 170 MG/DL
CO2 SERPL-SCNC: 26 MMOL/L
CREAT SERPL-MCNC: 0.76 MG/DL
EGFR: 99 ML/MIN/1.73M2
ESTIMATED AVERAGE GLUCOSE: 126 MG/DL
GLUCOSE SERPL-MCNC: 92 MG/DL
HBA1C MFR BLD HPLC: 6 %
HCT VFR BLD CALC: 41.1 %
HDLC SERPL-MCNC: 34 MG/DL
HGB BLD-MCNC: 13.1 G/DL
LDLC SERPL CALC-MCNC: 116 MG/DL
MCHC RBC-ENTMCNC: 26 PG
MCHC RBC-ENTMCNC: 31.9 GM/DL
MCV RBC AUTO: 81.5 FL
NONHDLC SERPL-MCNC: 137 MG/DL
PLATELET # BLD AUTO: 284 K/UL
POTASSIUM SERPL-SCNC: 4.6 MMOL/L
PROT SERPL-MCNC: 7.3 G/DL
RBC # BLD: 5.04 M/UL
RBC # FLD: 15.3 %
SODIUM SERPL-SCNC: 138 MMOL/L
TRIGL SERPL-MCNC: 112 MG/DL
WBC # FLD AUTO: 5.2 K/UL

## 2023-12-13 ENCOUNTER — NON-APPOINTMENT (OUTPATIENT)
Age: 68
End: 2023-12-13

## 2024-01-04 ENCOUNTER — NON-APPOINTMENT (OUTPATIENT)
Age: 69
End: 2024-01-04

## 2024-01-12 ENCOUNTER — NON-APPOINTMENT (OUTPATIENT)
Age: 69
End: 2024-01-12

## 2024-01-19 ENCOUNTER — NON-APPOINTMENT (OUTPATIENT)
Age: 69
End: 2024-01-19

## 2024-02-01 ENCOUNTER — APPOINTMENT (OUTPATIENT)
Dept: FAMILY MEDICINE | Facility: CLINIC | Age: 69
End: 2024-02-01
Payer: MEDICARE

## 2024-02-01 VITALS
RESPIRATION RATE: 20 BRPM | WEIGHT: 191 LBS | HEIGHT: 70 IN | HEART RATE: 68 BPM | SYSTOLIC BLOOD PRESSURE: 125 MMHG | DIASTOLIC BLOOD PRESSURE: 75 MMHG | BODY MASS INDEX: 27.35 KG/M2

## 2024-02-01 DIAGNOSIS — Z00.00 ENCOUNTER FOR GENERAL ADULT MEDICAL EXAMINATION W/OUT ABNORMAL FINDINGS: ICD-10-CM

## 2024-02-01 DIAGNOSIS — Z87.891 PERSONAL HISTORY OF NICOTINE DEPENDENCE: ICD-10-CM

## 2024-02-01 PROCEDURE — 93000 ELECTROCARDIOGRAM COMPLETE: CPT

## 2024-02-01 PROCEDURE — G0439: CPT

## 2024-02-01 NOTE — HISTORY OF PRESENT ILLNESS
[FreeTextEntry1] : Requests comprehensive exam [de-identified] : Presents for comprehensive exam.  States feeling generally well; has known ongoing back issues but is using all medication appropriately to maintain daily functioning and QOL with no adverse events.  Again reviewed recent labs; discussed diet - pt states trying to reduce carbs and increase vegetables; remains as active as possible.  Reviewed screening and immunizations - note pt consistently declines vaccines; also note has not yet had an updated colonoscopy - has Dr. Guerrero's information - updated order entered.

## 2024-02-01 NOTE — HEALTH RISK ASSESSMENT
[Yes] : Yes [2 - 4 times a month (2 pts)] : 2-4 times a month (2 points) [1 or 2 (0 pts)] : 1 or 2 (0 points) [Never (0 pts)] : Never (0 points) [No] : In the past 12 months have you used drugs other than those required for medical reasons? No [No falls in past year] : Patient reported no falls in the past year [0] : 2) Feeling down, depressed, or hopeless: Not at all (0) [Audit-CScore] : 2 [[id number]] : 0 [Fully functional (bathing, dressing, toileting, transferring, walking, feeding)] : Fully functional (bathing, dressing, toileting, transferring, walking, feeding) [Fully functional (using the telephone, shopping, preparing meals, housekeeping, doing laundry, using] : Fully functional and needs no help or supervision to perform IADLs (using the telephone, shopping, preparing meals, housekeeping, doing laundry, using transportation, managing medications and managing finances) [With Patient/Caregiver] : , with patient/caregiver [Reviewed no changes] : Reviewed, no changes [AdvancecareDate] : 02/24 [Former] : Former [> 15 Years] : > 15 Years

## 2024-02-01 NOTE — COUNSELING
[de-identified] : Healthy eating and activities [None] : None [Good understanding] : Patient has a good understanding of lifestyle changes and steps needed to achieve self management goal

## 2024-02-12 ENCOUNTER — NON-APPOINTMENT (OUTPATIENT)
Age: 69
End: 2024-02-12

## 2024-02-22 ENCOUNTER — NON-APPOINTMENT (OUTPATIENT)
Age: 69
End: 2024-02-22

## 2024-02-25 ENCOUNTER — RX RENEWAL (OUTPATIENT)
Age: 69
End: 2024-02-25

## 2024-02-25 RX ORDER — BUPROPION HYDROCHLORIDE 300 MG/1
300 TABLET, EXTENDED RELEASE ORAL DAILY
Qty: 90 | Refills: 0 | Status: ACTIVE | COMMUNITY
Start: 2022-10-28 | End: 1900-01-01

## 2024-02-28 ENCOUNTER — APPOINTMENT (OUTPATIENT)
Dept: SURGERY | Facility: CLINIC | Age: 69
End: 2024-02-28
Payer: MEDICARE

## 2024-02-28 VITALS — BODY MASS INDEX: 27.35 KG/M2 | TEMPERATURE: 97.8 F | WEIGHT: 191 LBS | HEIGHT: 70 IN | RESPIRATION RATE: 16 BRPM

## 2024-02-28 DIAGNOSIS — K21.9 GASTRO-ESOPHAGEAL REFLUX DISEASE W/OUT ESOPHAGITIS: ICD-10-CM

## 2024-02-28 DIAGNOSIS — Z12.11 ENCOUNTER FOR SCREENING FOR MALIGNANT NEOPLASM OF COLON: ICD-10-CM

## 2024-02-28 DIAGNOSIS — Z81.8 FAMILY HISTORY OF OTHER MENTAL AND BEHAVIORAL DISORDERS: ICD-10-CM

## 2024-02-28 DIAGNOSIS — M48.061 SPINAL STENOSIS, LUMBAR REGION WITHOUT NEUROGENIC CLAUDICATION: ICD-10-CM

## 2024-02-28 PROCEDURE — 99203 OFFICE O/P NEW LOW 30 MIN: CPT

## 2024-02-28 RX ORDER — ICOSAPENT ETHYL 1 G/1
1 CAPSULE ORAL TWICE DAILY
Qty: 360 | Refills: 3 | Status: COMPLETED | COMMUNITY
Start: 2020-02-20 | End: 2024-02-28

## 2024-02-28 RX ORDER — PIROXICAM 20 MG/1
20 CAPSULE ORAL
Qty: 30 | Refills: 3 | Status: COMPLETED | COMMUNITY
Start: 2017-10-20 | End: 2024-02-28

## 2024-02-28 NOTE — HISTORY OF PRESENT ILLNESS
[de-identified] : This 68 year old man last underwent a colonoscopy over ten years ago. There is no FH of colon CA. The patient is asymptomatic regarding his GI tract.

## 2024-02-28 NOTE — PHYSICAL EXAM
[Normal Breath Sounds] : Normal breath sounds [Normal Rate and Rhythm] : normal rate and rhythm [Normal Heart Sounds] : normal heart sounds [Abdominal Masses] : No abdominal masses [No Rash or Lesion] : No rash or lesion [Abdomen Tenderness] : ~T ~M No abdominal tenderness [de-identified] : nl [de-identified] : nl [de-identified] : nl

## 2024-02-28 NOTE — ASSESSMENT
[FreeTextEntry1] : Discussion regarding all options and risks Bowel prep written and explained Scheduled for colonoscopy on 3/19/24 All lab values and imaging studies reviewed Discussed with Medicine

## 2024-03-04 ENCOUNTER — NON-APPOINTMENT (OUTPATIENT)
Age: 69
End: 2024-03-04

## 2024-03-12 ENCOUNTER — NON-APPOINTMENT (OUTPATIENT)
Age: 69
End: 2024-03-12

## 2024-03-12 RX ORDER — ATORVASTATIN CALCIUM 10 MG/1
10 TABLET, FILM COATED ORAL
Qty: 90 | Refills: 3 | Status: ACTIVE | COMMUNITY
Start: 2017-01-25 | End: 1900-01-01

## 2024-03-18 ENCOUNTER — TRANSCRIPTION ENCOUNTER (OUTPATIENT)
Age: 69
End: 2024-03-18

## 2024-03-19 ENCOUNTER — APPOINTMENT (OUTPATIENT)
Dept: SURGERY | Facility: HOSPITAL | Age: 69
End: 2024-03-19

## 2024-03-19 ENCOUNTER — OUTPATIENT (OUTPATIENT)
Dept: OUTPATIENT SERVICES | Facility: HOSPITAL | Age: 69
LOS: 1 days | End: 2024-03-19
Payer: MEDICARE

## 2024-03-19 ENCOUNTER — NON-APPOINTMENT (OUTPATIENT)
Age: 69
End: 2024-03-19

## 2024-03-19 DIAGNOSIS — K59.00 CONSTIPATION, UNSPECIFIED: ICD-10-CM

## 2024-03-19 DIAGNOSIS — Z12.11 ENCOUNTER FOR SCREENING FOR MALIGNANT NEOPLASM OF COLON: ICD-10-CM

## 2024-03-19 PROCEDURE — 45378 DIAGNOSTIC COLONOSCOPY: CPT

## 2024-03-19 PROCEDURE — G0121: CPT

## 2024-03-19 RX ORDER — SODIUM CHLORIDE 9 MG/ML
500 INJECTION INTRAMUSCULAR; INTRAVENOUS; SUBCUTANEOUS
Refills: 0 | Status: COMPLETED | OUTPATIENT
Start: 2024-03-19 | End: 2024-03-19

## 2024-03-19 RX ADMIN — SODIUM CHLORIDE 75 MILLILITER(S): 9 INJECTION INTRAMUSCULAR; INTRAVENOUS; SUBCUTANEOUS at 10:48

## 2024-04-04 ENCOUNTER — NON-APPOINTMENT (OUTPATIENT)
Age: 69
End: 2024-04-04

## 2024-04-11 ENCOUNTER — NON-APPOINTMENT (OUTPATIENT)
Age: 69
End: 2024-04-11

## 2024-04-18 ENCOUNTER — NON-APPOINTMENT (OUTPATIENT)
Age: 69
End: 2024-04-18

## 2024-04-23 RX ORDER — QUETIAPINE FUMARATE 25 MG/1
25 TABLET ORAL AT BEDTIME
Qty: 180 | Refills: 0 | Status: ACTIVE | COMMUNITY
Start: 2022-10-28 | End: 1900-01-01

## 2024-04-29 ENCOUNTER — NON-APPOINTMENT (OUTPATIENT)
Age: 69
End: 2024-04-29

## 2024-05-07 ENCOUNTER — APPOINTMENT (OUTPATIENT)
Dept: FAMILY MEDICINE | Facility: CLINIC | Age: 69
End: 2024-05-07
Payer: MEDICARE

## 2024-05-07 VITALS
BODY MASS INDEX: 28.49 KG/M2 | SYSTOLIC BLOOD PRESSURE: 126 MMHG | HEART RATE: 68 BPM | RESPIRATION RATE: 20 BRPM | HEIGHT: 70 IN | WEIGHT: 199 LBS | DIASTOLIC BLOOD PRESSURE: 75 MMHG

## 2024-05-07 DIAGNOSIS — R73.01 IMPAIRED FASTING GLUCOSE: ICD-10-CM

## 2024-05-07 DIAGNOSIS — E78.5 HYPERLIPIDEMIA, UNSPECIFIED: ICD-10-CM

## 2024-05-07 PROCEDURE — 99214 OFFICE O/P EST MOD 30 MIN: CPT

## 2024-05-07 PROCEDURE — G2211 COMPLEX E/M VISIT ADD ON: CPT

## 2024-05-07 PROCEDURE — 36415 COLL VENOUS BLD VENIPUNCTURE: CPT

## 2024-05-07 NOTE — ASSESSMENT
[FreeTextEntry1] : Hemodynamically stable with acceptable BP Lab profiles drawn in office and sent Dizziness - ? etiology - note normal EKG on recent comprehensive visit; however feel appropriate to recommend an updated Cardiology evaluation (has seen Cardiology with full workup but many years ago) - states knows Dr. Rangel

## 2024-05-07 NOTE — HISTORY OF PRESENT ILLNESS
[de-identified] : Presents for BP check, labs, and general follow-up.  Pt states trying to maintain a healthy diet and remain as active as possible. Does now states has been noting dizziness on exertion - states "if I do too much I have to sit down."  Denies CP, palpitations during these episodes.  States feels "fine" now.

## 2024-05-08 LAB
ALBUMIN SERPL ELPH-MCNC: 4.9 G/DL
ALP BLD-CCNC: 88 U/L
ALT SERPL-CCNC: 20 U/L
ANION GAP SERPL CALC-SCNC: 11 MMOL/L
AST SERPL-CCNC: 18 U/L
BILIRUB SERPL-MCNC: 0.2 MG/DL
BUN SERPL-MCNC: 13 MG/DL
CALCIUM SERPL-MCNC: 9 MG/DL
CHLORIDE SERPL-SCNC: 103 MMOL/L
CHOLEST SERPL-MCNC: 163 MG/DL
CO2 SERPL-SCNC: 25 MMOL/L
CREAT SERPL-MCNC: 0.86 MG/DL
EGFR: 95 ML/MIN/1.73M2
ESTIMATED AVERAGE GLUCOSE: 123 MG/DL
FOLATE SERPL-MCNC: 18.1 NG/ML
GLUCOSE SERPL-MCNC: 75 MG/DL
HBA1C MFR BLD HPLC: 5.9 %
HCT VFR BLD CALC: 40.1 %
HDLC SERPL-MCNC: 34 MG/DL
HGB BLD-MCNC: 12.6 G/DL
LDLC SERPL CALC-MCNC: 102 MG/DL
MCHC RBC-ENTMCNC: 25.9 PG
MCHC RBC-ENTMCNC: 31.4 GM/DL
MCV RBC AUTO: 82.5 FL
NONHDLC SERPL-MCNC: 129 MG/DL
PLATELET # BLD AUTO: 279 K/UL
POTASSIUM SERPL-SCNC: 4.7 MMOL/L
PROT SERPL-MCNC: 7.1 G/DL
PSA SERPL-MCNC: 3.88 NG/ML
RBC # BLD: 4.86 M/UL
RBC # FLD: 15.7 %
SODIUM SERPL-SCNC: 139 MMOL/L
T4 FREE SERPL-MCNC: 1.1 NG/DL
TRIGL SERPL-MCNC: 151 MG/DL
TSH SERPL-ACNC: 1.45 UIU/ML
VIT B12 SERPL-MCNC: >2000 PG/ML
WBC # FLD AUTO: 4.55 K/UL

## 2024-05-09 ENCOUNTER — NON-APPOINTMENT (OUTPATIENT)
Age: 69
End: 2024-05-09

## 2024-05-20 ENCOUNTER — NON-APPOINTMENT (OUTPATIENT)
Age: 69
End: 2024-05-20

## 2024-05-20 ENCOUNTER — APPOINTMENT (OUTPATIENT)
Dept: CARDIOLOGY | Facility: CLINIC | Age: 69
End: 2024-05-20
Payer: MEDICARE

## 2024-05-20 VITALS — DIASTOLIC BLOOD PRESSURE: 80 MMHG | SYSTOLIC BLOOD PRESSURE: 140 MMHG

## 2024-05-20 VITALS
DIASTOLIC BLOOD PRESSURE: 70 MMHG | BODY MASS INDEX: 27.77 KG/M2 | HEIGHT: 70 IN | OXYGEN SATURATION: 96 % | SYSTOLIC BLOOD PRESSURE: 127 MMHG | HEART RATE: 75 BPM | WEIGHT: 194 LBS

## 2024-05-20 DIAGNOSIS — R42 DIZZINESS AND GIDDINESS: ICD-10-CM

## 2024-05-20 DIAGNOSIS — I95.1 ORTHOSTATIC HYPOTENSION: ICD-10-CM

## 2024-05-20 DIAGNOSIS — R06.09 OTHER FORMS OF DYSPNEA: ICD-10-CM

## 2024-05-20 DIAGNOSIS — R94.31 ABNORMAL ELECTROCARDIOGRAM [ECG] [EKG]: ICD-10-CM

## 2024-05-20 PROCEDURE — 93000 ELECTROCARDIOGRAM COMPLETE: CPT

## 2024-05-20 PROCEDURE — 99204 OFFICE O/P NEW MOD 45 MIN: CPT

## 2024-05-20 RX ORDER — SODIUM PICOSULFATE, MAGNESIUM OXIDE, AND ANHYDROUS CITRIC ACID 12; 3.5; 1 G/175ML; G/175ML; MG/175ML
10-3.5-12 MG-GM LIQUID ORAL
Qty: 1 | Refills: 0 | Status: COMPLETED | COMMUNITY
Start: 2024-02-28 | End: 2024-05-20

## 2024-05-20 NOTE — PHYSICAL EXAM
[Well Developed] : well developed [Well Nourished] : well nourished [No Acute Distress] : no acute distress [Normal Conjunctiva] : normal conjunctiva [Normal Venous Pressure] : normal venous pressure [No Carotid Bruit] : no carotid bruit [Normal S1, S2] : normal S1, S2 [No Murmur] : no murmur [No Rub] : no rub [No Gallop] : no gallop [Clear Lung Fields] : clear lung fields [Good Air Entry] : good air entry [No Respiratory Distress] : no respiratory distress  [Soft] : abdomen soft [Non Tender] : non-tender [No Masses/organomegaly] : no masses/organomegaly [Normal Bowel Sounds] : normal bowel sounds [No Edema] : no edema [No Cyanosis] : no cyanosis [No Clubbing] : no clubbing [No Varicosities] : no varicosities [No Rash] : no rash [No Skin Lesions] : no skin lesions [Moves all extremities] : moves all extremities [No Focal Deficits] : no focal deficits [Normal Speech] : normal speech [Alert and Oriented] : alert and oriented [Normal memory] : normal memory [de-identified] : pt cant walk on treadmill, has severe spinal issues, pain, on narcotics, gets dizzy and pre syncopal when walks

## 2024-05-20 NOTE — DISCUSSION/SUMMARY
[Hyperlipidemia] : hyperlipidemia [Stress Test Adenosine] : an adenosine stress test [Family History of CAD] : family history of CAD [Stress Test Pharmacologic] : a pharmacologic stress test [Orthostatic Hypotension] : orthostatic [Echocardiogram] : an echocardiogram [de-identified] : pre-syncope, dizzy [de-identified] : 20 mm systolic dec BP standing [de-identified] : carotid doppler, consider sarbjit, neuro f/u

## 2024-05-20 NOTE — REASON FOR VISIT
[FreeTextEntry1] : Patient with positive cardiac risk factors for CAD presents for initial evaluation. pt says walking up inclines gets dizziness for years, presyncopal, no syncope in past,also feels at night, body jolts, has tinitis chronic and screeching sound pt occasional dyspnea up inclines, no chg

## 2024-05-23 ENCOUNTER — RX RENEWAL (OUTPATIENT)
Age: 69
End: 2024-05-23

## 2024-05-23 RX ORDER — DULOXETINE HYDROCHLORIDE 60 MG/1
60 CAPSULE, DELAYED RELEASE PELLETS ORAL
Qty: 180 | Refills: 0 | Status: ACTIVE | COMMUNITY
Start: 2018-02-06 | End: 1900-01-01

## 2024-06-03 ENCOUNTER — APPOINTMENT (OUTPATIENT)
Dept: CARDIOLOGY | Facility: CLINIC | Age: 69
End: 2024-06-03
Payer: MEDICARE

## 2024-06-03 PROCEDURE — 93306 TTE W/DOPPLER COMPLETE: CPT

## 2024-06-09 ENCOUNTER — NON-APPOINTMENT (OUTPATIENT)
Age: 69
End: 2024-06-09

## 2024-06-10 RX ORDER — OXYCODONE 36 MG/1
36 CAPSULE, EXTENDED RELEASE ORAL
Qty: 30 | Refills: 0 | Status: ACTIVE | COMMUNITY
Start: 2021-03-23 | End: 1900-01-01

## 2024-06-18 ENCOUNTER — NON-APPOINTMENT (OUTPATIENT)
Age: 69
End: 2024-06-18

## 2024-06-18 RX ORDER — HYDROCODONE BITARTRATE AND ACETAMINOPHEN 10; 325 MG/1; MG/1
10-325 TABLET ORAL
Qty: 180 | Refills: 0 | Status: ACTIVE | COMMUNITY
Start: 2017-01-31 | End: 1900-01-01

## 2024-06-20 ENCOUNTER — APPOINTMENT (OUTPATIENT)
Dept: CARDIOLOGY | Facility: CLINIC | Age: 69
End: 2024-06-20
Payer: MEDICARE

## 2024-06-20 PROCEDURE — A9500: CPT

## 2024-06-20 PROCEDURE — 78452 HT MUSCLE IMAGE SPECT MULT: CPT

## 2024-06-20 PROCEDURE — 93015 CV STRESS TEST SUPVJ I&R: CPT

## 2024-06-25 ENCOUNTER — NON-APPOINTMENT (OUTPATIENT)
Age: 69
End: 2024-06-25

## 2024-06-25 ENCOUNTER — APPOINTMENT (OUTPATIENT)
Dept: CARDIOLOGY | Facility: CLINIC | Age: 69
End: 2024-06-25
Payer: MEDICARE

## 2024-06-25 PROCEDURE — 93880 EXTRACRANIAL BILAT STUDY: CPT

## 2024-06-25 RX ORDER — TIZANIDINE 4 MG/1
4 TABLET ORAL EVERY 6 HOURS
Qty: 40 | Refills: 1 | Status: ACTIVE | COMMUNITY
Start: 2024-06-25 | End: 1900-01-01

## 2024-07-03 ENCOUNTER — OUTPATIENT (OUTPATIENT)
Dept: OUTPATIENT SERVICES | Facility: HOSPITAL | Age: 69
LOS: 1 days | End: 2024-07-03
Payer: MEDICARE

## 2024-07-03 ENCOUNTER — APPOINTMENT (OUTPATIENT)
Dept: MRI IMAGING | Facility: HOSPITAL | Age: 69
End: 2024-07-03
Payer: MEDICARE

## 2024-07-03 ENCOUNTER — RESULT REVIEW (OUTPATIENT)
Age: 69
End: 2024-07-03

## 2024-07-03 DIAGNOSIS — M54.9 DORSALGIA, UNSPECIFIED: ICD-10-CM

## 2024-07-03 PROCEDURE — 72148 MRI LUMBAR SPINE W/O DYE: CPT | Mod: 26

## 2024-07-03 PROCEDURE — 72148 MRI LUMBAR SPINE W/O DYE: CPT

## 2024-07-11 ENCOUNTER — NON-APPOINTMENT (OUTPATIENT)
Age: 69
End: 2024-07-11

## 2024-07-17 ENCOUNTER — NON-APPOINTMENT (OUTPATIENT)
Age: 69
End: 2024-07-17

## 2024-07-25 ENCOUNTER — RX RENEWAL (OUTPATIENT)
Age: 69
End: 2024-07-25

## 2024-07-26 ENCOUNTER — NON-APPOINTMENT (OUTPATIENT)
Age: 69
End: 2024-07-26

## 2024-08-08 ENCOUNTER — NON-APPOINTMENT (OUTPATIENT)
Age: 69
End: 2024-08-08

## 2024-08-20 ENCOUNTER — NON-APPOINTMENT (OUTPATIENT)
Age: 69
End: 2024-08-20

## 2024-08-23 ENCOUNTER — NON-APPOINTMENT (OUTPATIENT)
Age: 69
End: 2024-08-23

## 2024-09-05 ENCOUNTER — NON-APPOINTMENT (OUTPATIENT)
Age: 69
End: 2024-09-05

## 2024-09-05 RX ORDER — NALOXONE HYDROCHLORIDE 4 MG/.1ML
4 SPRAY NASAL
Qty: 1 | Refills: 3 | Status: ACTIVE | COMMUNITY
Start: 2024-09-05 | End: 1900-01-01

## 2024-09-17 ENCOUNTER — NON-APPOINTMENT (OUTPATIENT)
Age: 69
End: 2024-09-17

## 2024-09-23 ENCOUNTER — NON-APPOINTMENT (OUTPATIENT)
Age: 69
End: 2024-09-23

## 2024-10-03 ENCOUNTER — APPOINTMENT (OUTPATIENT)
Dept: FAMILY MEDICINE | Facility: CLINIC | Age: 69
End: 2024-10-03
Payer: MEDICARE

## 2024-10-03 VITALS
RESPIRATION RATE: 20 BRPM | DIASTOLIC BLOOD PRESSURE: 70 MMHG | WEIGHT: 190 LBS | HEIGHT: 70 IN | SYSTOLIC BLOOD PRESSURE: 124 MMHG | BODY MASS INDEX: 27.2 KG/M2 | HEART RATE: 76 BPM

## 2024-10-03 DIAGNOSIS — R73.01 IMPAIRED FASTING GLUCOSE: ICD-10-CM

## 2024-10-03 DIAGNOSIS — E78.5 HYPERLIPIDEMIA, UNSPECIFIED: ICD-10-CM

## 2024-10-03 DIAGNOSIS — M48.061 SPINAL STENOSIS, LUMBAR REGION WITHOUT NEUROGENIC CLAUDICATION: ICD-10-CM

## 2024-10-03 PROCEDURE — G2211 COMPLEX E/M VISIT ADD ON: CPT

## 2024-10-03 PROCEDURE — 99214 OFFICE O/P EST MOD 30 MIN: CPT

## 2024-10-03 PROCEDURE — 36415 COLL VENOUS BLD VENIPUNCTURE: CPT

## 2024-10-03 NOTE — PHYSICAL EXAM
[No Acute Distress] : no acute distress [No Edema] : there was no peripheral edema [Normal] : soft, non-tender, non-distended, no masses palpated, no HSM and normal bowel sounds [Normal Posterior Cervical Nodes] : no posterior cervical lymphadenopathy [Normal Anterior Cervical Nodes] : no anterior cervical lymphadenopathy [Muscle Spasms, Bilateral] : bilateral muscle spasms [No Focal Deficits] : no focal deficits [Alert and Oriented x3] : oriented to person, place, and time [de-identified] : mild LE weakness

## 2024-10-03 NOTE — HISTORY OF PRESENT ILLNESS
[de-identified] : Presents for BP check, labs, and general follow-up.  Trying to maintain a healthy diet and remain active - states loves to do yard work.  Reviewed medication and renewed as requested - pt has long-standing known spinal stenosis - states current medication enables daily activities and acceptable QOL - states again "it's given me my life back."  Using all medication appropriately with no adverse events.

## 2024-10-05 LAB
ALBUMIN SERPL ELPH-MCNC: 4.7 G/DL
ALP BLD-CCNC: 75 U/L
ALT SERPL-CCNC: 17 U/L
ANION GAP SERPL CALC-SCNC: 12 MMOL/L
AST SERPL-CCNC: 20 U/L
BILIRUB SERPL-MCNC: 0.2 MG/DL
BUN SERPL-MCNC: 16 MG/DL
CALCIUM SERPL-MCNC: 9.1 MG/DL
CHLORIDE SERPL-SCNC: 103 MMOL/L
CHOLEST SERPL-MCNC: 175 MG/DL
CO2 SERPL-SCNC: 25 MMOL/L
CREAT SERPL-MCNC: 0.9 MG/DL
EGFR: 94 ML/MIN/1.73M2
ESTIMATED AVERAGE GLUCOSE: 126 MG/DL
FOLATE SERPL-MCNC: 15.1 NG/ML
GLUCOSE SERPL-MCNC: 91 MG/DL
HBA1C MFR BLD HPLC: 6 %
HCT VFR BLD CALC: 37.2 %
HDLC SERPL-MCNC: 35 MG/DL
HGB BLD-MCNC: 11.1 G/DL
LDLC SERPL CALC-MCNC: 120 MG/DL
MCHC RBC-ENTMCNC: 24.5 PG
MCHC RBC-ENTMCNC: 29.8 GM/DL
MCV RBC AUTO: 82.1 FL
NONHDLC SERPL-MCNC: 140 MG/DL
PLATELET # BLD AUTO: 284 K/UL
POTASSIUM SERPL-SCNC: 4.5 MMOL/L
PROT SERPL-MCNC: 7.3 G/DL
PSA SERPL-MCNC: 2.68 NG/ML
RBC # BLD: 4.53 M/UL
RBC # FLD: 14.9 %
SODIUM SERPL-SCNC: 140 MMOL/L
T4 FREE SERPL-MCNC: 1.2 NG/DL
TRIGL SERPL-MCNC: 109 MG/DL
TSH SERPL-ACNC: 1.68 UIU/ML
VIT B12 SERPL-MCNC: >2000 PG/ML
WBC # FLD AUTO: 6.15 K/UL

## 2024-10-14 ENCOUNTER — NON-APPOINTMENT (OUTPATIENT)
Age: 69
End: 2024-10-14

## 2024-10-22 ENCOUNTER — NON-APPOINTMENT (OUTPATIENT)
Age: 69
End: 2024-10-22

## 2024-10-31 ENCOUNTER — NON-APPOINTMENT (OUTPATIENT)
Age: 69
End: 2024-10-31

## 2024-11-02 ENCOUNTER — RX RENEWAL (OUTPATIENT)
Age: 69
End: 2024-11-02

## 2024-11-16 ENCOUNTER — NON-APPOINTMENT (OUTPATIENT)
Age: 69
End: 2024-11-16

## 2024-11-18 ENCOUNTER — RX RENEWAL (OUTPATIENT)
Age: 69
End: 2024-11-18

## 2024-11-21 ENCOUNTER — NON-APPOINTMENT (OUTPATIENT)
Age: 69
End: 2024-11-21

## 2024-12-02 ENCOUNTER — NON-APPOINTMENT (OUTPATIENT)
Age: 69
End: 2024-12-02

## 2024-12-16 ENCOUNTER — NON-APPOINTMENT (OUTPATIENT)
Age: 69
End: 2024-12-16

## 2024-12-19 ENCOUNTER — NON-APPOINTMENT (OUTPATIENT)
Age: 69
End: 2024-12-19

## 2024-12-31 ENCOUNTER — NON-APPOINTMENT (OUTPATIENT)
Age: 69
End: 2024-12-31

## 2025-01-13 ENCOUNTER — NON-APPOINTMENT (OUTPATIENT)
Age: 70
End: 2025-01-13

## 2025-01-21 ENCOUNTER — NON-APPOINTMENT (OUTPATIENT)
Age: 70
End: 2025-01-21

## 2025-01-31 ENCOUNTER — NON-APPOINTMENT (OUTPATIENT)
Age: 70
End: 2025-01-31

## 2025-02-12 ENCOUNTER — NON-APPOINTMENT (OUTPATIENT)
Age: 70
End: 2025-02-12

## 2025-02-17 ENCOUNTER — RX RENEWAL (OUTPATIENT)
Age: 70
End: 2025-02-17

## 2025-02-18 ENCOUNTER — NON-APPOINTMENT (OUTPATIENT)
Age: 70
End: 2025-02-18

## 2025-02-26 ENCOUNTER — RX RENEWAL (OUTPATIENT)
Age: 70
End: 2025-02-26

## 2025-03-03 ENCOUNTER — APPOINTMENT (OUTPATIENT)
Dept: FAMILY MEDICINE | Facility: CLINIC | Age: 70
End: 2025-03-03
Payer: MEDICARE

## 2025-03-03 VITALS
RESPIRATION RATE: 20 BRPM | BODY MASS INDEX: 28.2 KG/M2 | HEIGHT: 70 IN | SYSTOLIC BLOOD PRESSURE: 126 MMHG | DIASTOLIC BLOOD PRESSURE: 70 MMHG | HEART RATE: 76 BPM | WEIGHT: 197 LBS

## 2025-03-03 DIAGNOSIS — H93.19 TINNITUS, UNSPECIFIED EAR: ICD-10-CM

## 2025-03-03 DIAGNOSIS — E78.5 HYPERLIPIDEMIA, UNSPECIFIED: ICD-10-CM

## 2025-03-03 DIAGNOSIS — F41.9 ANXIETY DISORDER, UNSPECIFIED: ICD-10-CM

## 2025-03-03 DIAGNOSIS — M48.061 SPINAL STENOSIS, LUMBAR REGION WITHOUT NEUROGENIC CLAUDICATION: ICD-10-CM

## 2025-03-03 DIAGNOSIS — R73.01 IMPAIRED FASTING GLUCOSE: ICD-10-CM

## 2025-03-03 DIAGNOSIS — R41.3 OTHER AMNESIA: ICD-10-CM

## 2025-03-03 DIAGNOSIS — F32.A ANXIETY DISORDER, UNSPECIFIED: ICD-10-CM

## 2025-03-03 PROCEDURE — G2211 COMPLEX E/M VISIT ADD ON: CPT

## 2025-03-03 PROCEDURE — 99214 OFFICE O/P EST MOD 30 MIN: CPT

## 2025-03-03 PROCEDURE — 36415 COLL VENOUS BLD VENIPUNCTURE: CPT

## 2025-03-05 ENCOUNTER — NON-APPOINTMENT (OUTPATIENT)
Age: 70
End: 2025-03-05

## 2025-03-06 LAB
ALBUMIN SERPL ELPH-MCNC: 4.7 G/DL
ALP BLD-CCNC: 95 U/L
ALT SERPL-CCNC: 19 U/L
ANION GAP SERPL CALC-SCNC: 10 MMOL/L
AST SERPL-CCNC: 15 U/L
BILIRUB SERPL-MCNC: 0.2 MG/DL
BUN SERPL-MCNC: 10 MG/DL
CALCIUM SERPL-MCNC: 9 MG/DL
CHLORIDE SERPL-SCNC: 102 MMOL/L
CHOLEST SERPL-MCNC: 168 MG/DL
CO2 SERPL-SCNC: 24 MMOL/L
CREAT SERPL-MCNC: 0.73 MG/DL
EGFRCR SERPLBLD CKD-EPI 2021: 99 ML/MIN/1.73M2
ESTIMATED AVERAGE GLUCOSE: 123 MG/DL
FOLATE SERPL-MCNC: 17.3 NG/ML
GLUCOSE SERPL-MCNC: 102 MG/DL
HBA1C MFR BLD HPLC: 5.9 %
HCT VFR BLD CALC: 36.4 %
HDLC SERPL-MCNC: 33 MG/DL
HGB BLD-MCNC: 10.8 G/DL
LDLC SERPL CALC-MCNC: 114 MG/DL
MCHC RBC-ENTMCNC: 22.5 PG
MCHC RBC-ENTMCNC: 29.7 G/DL
MCV RBC AUTO: 75.8 FL
NONHDLC SERPL-MCNC: 135 MG/DL
PLATELET # BLD AUTO: 311 K/UL
POTASSIUM SERPL-SCNC: 4.4 MMOL/L
PROT SERPL-MCNC: 7.1 G/DL
PSA SERPL-MCNC: 3.83 NG/ML
RBC # BLD: 4.8 M/UL
RBC # FLD: 15.8 %
SODIUM SERPL-SCNC: 135 MMOL/L
T4 FREE SERPL-MCNC: 1.2 NG/DL
TRIGL SERPL-MCNC: 117 MG/DL
TSH SERPL-ACNC: 1.29 UIU/ML
VIT B12 SERPL-MCNC: >2000 PG/ML
WBC # FLD AUTO: 4.73 K/UL

## 2025-03-10 ENCOUNTER — NON-APPOINTMENT (OUTPATIENT)
Age: 70
End: 2025-03-10

## 2025-03-11 ENCOUNTER — APPOINTMENT (OUTPATIENT)
Dept: FAMILY MEDICINE | Facility: CLINIC | Age: 70
End: 2025-03-11

## 2025-03-11 ENCOUNTER — NON-APPOINTMENT (OUTPATIENT)
Age: 70
End: 2025-03-11

## 2025-03-12 ENCOUNTER — NON-APPOINTMENT (OUTPATIENT)
Age: 70
End: 2025-03-12

## 2025-03-20 ENCOUNTER — NON-APPOINTMENT (OUTPATIENT)
Age: 70
End: 2025-03-20

## 2025-04-10 ENCOUNTER — NON-APPOINTMENT (OUTPATIENT)
Age: 70
End: 2025-04-10

## 2025-04-22 ENCOUNTER — NON-APPOINTMENT (OUTPATIENT)
Age: 70
End: 2025-04-22

## 2025-05-01 ENCOUNTER — APPOINTMENT (OUTPATIENT)
Dept: NEUROLOGY | Facility: CLINIC | Age: 70
End: 2025-05-01
Payer: MEDICARE

## 2025-05-01 ENCOUNTER — NON-APPOINTMENT (OUTPATIENT)
Age: 70
End: 2025-05-01

## 2025-05-01 VITALS
DIASTOLIC BLOOD PRESSURE: 76 MMHG | OXYGEN SATURATION: 98 % | SYSTOLIC BLOOD PRESSURE: 138 MMHG | HEART RATE: 61 BPM | BODY MASS INDEX: 28.2 KG/M2 | HEIGHT: 70 IN | WEIGHT: 197 LBS | TEMPERATURE: 97.9 F

## 2025-05-01 VITALS
TEMPERATURE: 97.9 F | WEIGHT: 197 LBS | HEART RATE: 61 BPM | SYSTOLIC BLOOD PRESSURE: 138 MMHG | DIASTOLIC BLOOD PRESSURE: 76 MMHG | BODY MASS INDEX: 28.2 KG/M2 | OXYGEN SATURATION: 98 % | HEIGHT: 70 IN

## 2025-05-01 DIAGNOSIS — R41.3 OTHER AMNESIA: ICD-10-CM

## 2025-05-01 PROCEDURE — G2211 COMPLEX E/M VISIT ADD ON: CPT

## 2025-05-01 PROCEDURE — 99204 OFFICE O/P NEW MOD 45 MIN: CPT

## 2025-05-08 ENCOUNTER — NON-APPOINTMENT (OUTPATIENT)
Age: 70
End: 2025-05-08

## 2025-05-08 ENCOUNTER — EMERGENCY (EMERGENCY)
Facility: HOSPITAL | Age: 70
LOS: 1 days | End: 2025-05-08
Attending: EMERGENCY MEDICINE | Admitting: EMERGENCY MEDICINE
Payer: MEDICARE

## 2025-05-08 VITALS
WEIGHT: 192.9 LBS | TEMPERATURE: 97 F | HEIGHT: 70 IN | SYSTOLIC BLOOD PRESSURE: 153 MMHG | DIASTOLIC BLOOD PRESSURE: 82 MMHG | RESPIRATION RATE: 18 BRPM | HEART RATE: 64 BPM | OXYGEN SATURATION: 100 %

## 2025-05-08 LAB — CK SERPL-CCNC: 166 U/L — SIGNIFICANT CHANGE UP (ref 30–200)

## 2025-05-08 PROCEDURE — 99284 EMERGENCY DEPT VISIT MOD MDM: CPT

## 2025-05-08 PROCEDURE — 82550 ASSAY OF CK (CPK): CPT

## 2025-05-08 PROCEDURE — 99283 EMERGENCY DEPT VISIT LOW MDM: CPT

## 2025-05-08 PROCEDURE — 36415 COLL VENOUS BLD VENIPUNCTURE: CPT

## 2025-05-08 RX ORDER — OXYCODONE HYDROCHLORIDE AND ACETAMINOPHEN 10; 325 MG/1; MG/1
1 TABLET ORAL
Qty: 8 | Refills: 0
Start: 2025-05-08

## 2025-05-08 NOTE — ED PROVIDER NOTE - CHIEF COMPLAINT
The patient is a 69y Male complaining of thigh pain/injury. Topical Ketoconazole Counseling: Patient counseled that this medication may cause skin irritation or allergic reactions.  In the event of skin irritation, the patient was advised to reduce the amount of the drug applied or use it less frequently.   The patient verbalized understanding of the proper use and possible adverse effects of ketoconazole.  All of the patient's questions and concerns were addressed.

## 2025-05-08 NOTE — ED ADULT NURSE NOTE - NSFALLHARMRISKINTERV_ED_ALL_ED

## 2025-05-08 NOTE — ED ADULT TRIAGE NOTE - CHIEF COMPLAINT QUOTE
Pt with c/o right thigh pain since yesterday s/p hitting his thigh on a table.  Last took percocet and aleve at 12pm with minimal relief.

## 2025-05-08 NOTE — ED PROVIDER NOTE - NSFOLLOWUPINSTRUCTIONS_ED_ALL_ED_FT
Contusion in Adults    WHAT YOU NEED TO KNOW:    What is a contusion? A contusion is a bruise that appears on your skin after an injury. A bruise happens when small blood vessels tear but skin does not. Blood leaks into nearby tissue, such as soft tissue or muscle.  Contusion    What increases my risk for a contusion?    A disorder that makes you bleed more easily    Kidney or liver disease, or an infection    Medicines such as blood thinners or certain over-the-counter medicines and herbal medicines    Weakened skin and muscles from older age or nutrition problems  What are the signs and symptoms of a contusion?    An area that may be black, blue, red, or darker than the skin around it    Pain that increases when you touch the bruise, walk, or use the area around the bruise    Swelling or a lump at the site of the bruise or near it    Stiffness or problems moving the bruised area of your body  How is a contusion diagnosed? Your healthcare provider may ask about any injuries, infections, or bleeding problems you had in the past. Your provider will check the skin over the injured area. Your provider may touch it to see where it hurts. Your provider may also check for problems you may have when you move your bruised area. You may also need any of the following:    Blood tests may be used to check for blood disorders or to see how long it takes for your blood to clot.    Ultrasound pictures may show how deep the bruise is and if any of your organs, such as your liver, are injured.    MRI pictures may show if a hematoma (pooling of blood) has started to form. You may be given contrast liquid to help the pictures show up better. Tell the healthcare provider if you have ever had an allergic reaction to contrast liquid. Do not enter the MRI room with anything metal. The MRI machine uses a powerful magnet. Metal can cause serious injury from the magnet. Tell the healthcare provider if you have any metal in or on your body.  How is a contusion treated? A contusion may heal without any treatment. The bruise may become lighter or change to green or yellow as it heals. Treatment depends on the part of your body that is injured, and how serious your injury is. You may need any of the following:    Medicine may be needed to treat or prevent pain or swelling.    Aspiration is a procedure to drain pooled blood in your muscle. This prevents increased pressure in the muscle.    Surgery may be done to repair a tear in the muscle or relieve pressure in the muscle caused by swelling.  Bruise Healing  What can I do to help my contusion heal?    Rest the injured area or use it less than usual. If you bruised your leg or foot, you may need crutches or a cane to help you walk. This will help you keep weight off your injured body part.    Apply ice to decrease swelling and pain. Ice may also help prevent tissue damage. Use an ice pack, or put crushed ice in a plastic bag. Cover it with a towel and place it on your bruise for 15 to 20 minutes every hour or as directed.    Use compression to support the area and decrease swelling. Wrap an elastic bandage around the area over the bruised muscle. Make sure the bandage is not too tight. You should be able to fit 1 finger between the bandage and your skin.    Elevate (raise) your injured body part above the level of your heart to help decrease pain and swelling. Use pillows, blankets, or rolled towels to elevate the area as often as you can.    Do not drink alcohol as directed. Alcohol may slow healing.    Do not stretch injured muscles right after your injury. Ask your healthcare provider when and how you may safely stretch after your injury. Gentle stretches can help increase your flexibility.    Do not massage the area or put heating pads on the bruise right after your injury. Heat and massage may slow healing. Your healthcare provider may tell you to apply heat after several days. At that time, heat will start to help the injury heal.  R.I.C.E.  How can I prevent a contusion?    Stretch and warm up before you play sports or exercise.    Wear protective gear when you play sports. Examples are shin guards and padding.    If you begin a new physical activity, start slowly to give your body a chance to adjust.  When should I seek immediate care?    You have new trouble moving the injured area.    You have tingling or numbness in or near the injured area.    Your hand or foot below the bruise gets cold or turns pale.  When should I call my doctor?    You find a new lump in the injured area.    Your symptoms do not improve with treatment after 4 to 5 days.    You have questions or concerns about your condition or care.

## 2025-05-08 NOTE — ED PROVIDER NOTE - PATIENT PORTAL LINK FT
You can access the FollowMyHealth Patient Portal offered by [facility] by registering at the following website: [email or web address]. By joining [employer]’s FollowMyHealth portal, you will also be able to view your health information using other applications (apps) compatible with our system.

## 2025-05-08 NOTE — ED PROVIDER NOTE - CLINICAL SUMMARY MEDICAL DECISION MAKING FREE TEXT BOX
Patient presenting with right thigh pain after blunt trauma.  Likely hematoma in the muscle.  There are no signs of compartment syndrome and we are 26 hours out and swelling is going down.  I offered the patient an x-ray to evaluate for any type of fracture however he declined at the current time.  I will get a CK to evaluate for the possibility of any type of rhabdomyolysis with the patient having continued pain.  I believe this is a musculoskeletal injury.  Patient will like pain medication oral.  If workup here is negative patient will require MRI as an outpatient

## 2025-05-08 NOTE — ED PROVIDER NOTE - OBJECTIVE STATEMENT
69-year-old male with no significant past medical history presenting to the [facility] today with pain in his right thigh.  Patient states approximately 24 hours ago he had blunt trauma against a object in his basement in the mid femur area.  That pain has continued since that period of time with swelling.  States that with some ice today the swelling has gone down but it continues to bother him and he is having problems flexing at the hip secondary to pain in his mid femur.  Denies any other trauma at this time patient does not take any blood thinners.

## 2025-05-08 NOTE — ED PROVIDER NOTE - PROGRESS NOTE DETAILS
Patient CK is unremarkable.  Will send medications to the pharmacy.  Can follow-up with orthopedic surgery.  Low suspicion for compartment syndrome

## 2025-05-08 NOTE — ED PROVIDER NOTE - PHYSICAL EXAMINATION
Vitals: I have reviewed the patients vital signs  General: nontoxic appearing  HEENT: Atraumatic, normocephalic, airway patent  Eyes: EOMI, tracking appropriately  Neck: no tracheal deviation  Chest/Lungs: no trauma, symmetric chest rise, speaking in complete sentences,  no resp distress  Heart: skin and extremities well perfused, regular rate and rhythm  Neuro: A+Ox3, appears non focal  MSK: no deformities right anterior lateral thigh is tender to palpation swollen hard to palpation.  It is not tense.  There is good distal PMS.  There are no paresthesias pain in the distal extremity there is a palpable pulse in the right foot.  No visible bruising  Skin: no cyanosis, no jaundice   Psych:  Normal mood and affect

## 2025-05-08 NOTE — ED ADULT NURSE NOTE - OBJECTIVE STATEMENT
Pt came from home with c/o right thigh pain since yesterday. Patient states approximately 24 hours ago he had blunt trauma against a object in his basement in the mid femur area. Denies any other injuries.

## 2025-05-13 NOTE — CHART NOTE - NSCHARTNOTEFT_GEN_A_CORE
69 y o male presenting to the ED on 05/08 with thigh pain as per chart.  Per HIE, the patient has the recommended primary care follow up scheduled on 05/20 @ 2:30 pm with Dr. Panda Camp.

## 2025-05-15 ENCOUNTER — RX RENEWAL (OUTPATIENT)
Age: 70
End: 2025-05-15

## 2025-05-20 ENCOUNTER — APPOINTMENT (OUTPATIENT)
Dept: FAMILY MEDICINE | Facility: CLINIC | Age: 70
End: 2025-05-20
Payer: MEDICARE

## 2025-05-20 VITALS
HEIGHT: 70 IN | SYSTOLIC BLOOD PRESSURE: 126 MMHG | DIASTOLIC BLOOD PRESSURE: 70 MMHG | HEART RATE: 68 BPM | WEIGHT: 192 LBS | BODY MASS INDEX: 27.49 KG/M2 | RESPIRATION RATE: 20 BRPM

## 2025-05-20 DIAGNOSIS — R73.01 IMPAIRED FASTING GLUCOSE: ICD-10-CM

## 2025-05-20 DIAGNOSIS — E78.5 HYPERLIPIDEMIA, UNSPECIFIED: ICD-10-CM

## 2025-05-20 DIAGNOSIS — D64.9 ANEMIA, UNSPECIFIED: ICD-10-CM

## 2025-05-20 PROCEDURE — 99214 OFFICE O/P EST MOD 30 MIN: CPT

## 2025-05-20 PROCEDURE — 36415 COLL VENOUS BLD VENIPUNCTURE: CPT

## 2025-05-20 PROCEDURE — G2211 COMPLEX E/M VISIT ADD ON: CPT

## 2025-05-21 LAB
ALBUMIN SERPL ELPH-MCNC: 4.7 G/DL
ALP BLD-CCNC: 81 U/L
ALT SERPL-CCNC: 24 U/L
ANION GAP SERPL CALC-SCNC: 14 MMOL/L
AST SERPL-CCNC: 27 U/L
BILIRUB SERPL-MCNC: 0.6 MG/DL
BUN SERPL-MCNC: 12 MG/DL
CALCIUM SERPL-MCNC: 8.8 MG/DL
CHLORIDE SERPL-SCNC: 101 MMOL/L
CHOLEST SERPL-MCNC: 161 MG/DL
CO2 SERPL-SCNC: 22 MMOL/L
CREAT SERPL-MCNC: 0.71 MG/DL
EGFRCR SERPLBLD CKD-EPI 2021: 100 ML/MIN/1.73M2
ESTIMATED AVERAGE GLUCOSE: 105 MG/DL
FOLATE SERPL-MCNC: 19.6 NG/ML
GLUCOSE SERPL-MCNC: 89 MG/DL
HBA1C MFR BLD HPLC: 5.3 %
HCT VFR BLD CALC: 40.7 %
HDLC SERPL-MCNC: 34 MG/DL
HGB BLD-MCNC: 13.1 G/DL
IRON SATN MFR SERPL: 32 %
IRON SERPL-MCNC: 97 UG/DL
LDLC SERPL-MCNC: 97 MG/DL
MCHC RBC-ENTMCNC: 28.6 PG
MCHC RBC-ENTMCNC: 32.2 G/DL
MCV RBC AUTO: 88.9 FL
NONHDLC SERPL-MCNC: 126 MG/DL
PLATELET # BLD AUTO: 269 K/UL
POTASSIUM SERPL-SCNC: 4.3 MMOL/L
PROT SERPL-MCNC: 7.1 G/DL
PSA SERPL-MCNC: 4.69 NG/ML
RBC # BLD: 4.58 M/UL
RBC # FLD: 21.5 %
SODIUM SERPL-SCNC: 138 MMOL/L
TIBC SERPL-MCNC: 303 UG/DL
TRIGL SERPL-MCNC: 167 MG/DL
UIBC SERPL-MCNC: 206 UG/DL
VIT B12 SERPL-MCNC: >2000 PG/ML
WBC # FLD AUTO: 5.22 K/UL

## 2025-06-09 ENCOUNTER — NON-APPOINTMENT (OUTPATIENT)
Age: 70
End: 2025-06-09

## 2025-06-13 ENCOUNTER — NON-APPOINTMENT (OUTPATIENT)
Age: 70
End: 2025-06-13

## 2025-06-19 ENCOUNTER — NON-APPOINTMENT (OUTPATIENT)
Age: 70
End: 2025-06-19

## 2025-07-10 ENCOUNTER — NON-APPOINTMENT (OUTPATIENT)
Age: 70
End: 2025-07-10

## 2025-07-21 ENCOUNTER — NON-APPOINTMENT (OUTPATIENT)
Age: 70
End: 2025-07-21

## 2025-07-22 ENCOUNTER — NON-APPOINTMENT (OUTPATIENT)
Age: 70
End: 2025-07-22

## 2025-08-07 ENCOUNTER — NON-APPOINTMENT (OUTPATIENT)
Age: 70
End: 2025-08-07

## 2025-08-11 ENCOUNTER — NON-APPOINTMENT (OUTPATIENT)
Age: 70
End: 2025-08-11

## 2025-08-15 ENCOUNTER — RX RENEWAL (OUTPATIENT)
Age: 70
End: 2025-08-15

## 2025-08-21 ENCOUNTER — APPOINTMENT (OUTPATIENT)
Dept: FAMILY MEDICINE | Facility: CLINIC | Age: 70
End: 2025-08-21
Payer: MEDICARE

## 2025-08-21 VITALS
RESPIRATION RATE: 20 BRPM | BODY MASS INDEX: 26.34 KG/M2 | HEART RATE: 68 BPM | HEIGHT: 70 IN | DIASTOLIC BLOOD PRESSURE: 70 MMHG | WEIGHT: 184 LBS | SYSTOLIC BLOOD PRESSURE: 125 MMHG

## 2025-08-21 DIAGNOSIS — G47.00 INSOMNIA, UNSPECIFIED: ICD-10-CM

## 2025-08-21 DIAGNOSIS — Z87.891 PERSONAL HISTORY OF NICOTINE DEPENDENCE: ICD-10-CM

## 2025-08-21 DIAGNOSIS — R73.01 IMPAIRED FASTING GLUCOSE: ICD-10-CM

## 2025-08-21 DIAGNOSIS — M48.061 SPINAL STENOSIS, LUMBAR REGION WITHOUT NEUROGENIC CLAUDICATION: ICD-10-CM

## 2025-08-21 DIAGNOSIS — E78.5 HYPERLIPIDEMIA, UNSPECIFIED: ICD-10-CM

## 2025-08-21 PROCEDURE — 36415 COLL VENOUS BLD VENIPUNCTURE: CPT

## 2025-08-21 PROCEDURE — G0439: CPT

## 2025-08-22 LAB
ALBUMIN SERPL ELPH-MCNC: 4.6 G/DL
ALP BLD-CCNC: 91 U/L
ALT SERPL-CCNC: 31 U/L
ANION GAP SERPL CALC-SCNC: 12 MMOL/L
AST SERPL-CCNC: 22 U/L
BILIRUB SERPL-MCNC: 0.3 MG/DL
BUN SERPL-MCNC: 10 MG/DL
CALCIUM SERPL-MCNC: 8.8 MG/DL
CHLORIDE SERPL-SCNC: 101 MMOL/L
CHOLEST SERPL-MCNC: 169 MG/DL
CO2 SERPL-SCNC: 25 MMOL/L
CREAT SERPL-MCNC: 0.76 MG/DL
EGFRCR SERPLBLD CKD-EPI 2021: 98 ML/MIN/1.73M2
ESTIMATED AVERAGE GLUCOSE: 111 MG/DL
GLUCOSE SERPL-MCNC: 99 MG/DL
HBA1C MFR BLD HPLC: 5.5 %
HCT VFR BLD CALC: 44.3 %
HDLC SERPL-MCNC: 32 MG/DL
HGB BLD-MCNC: 14.7 G/DL
LDLC SERPL-MCNC: 100 MG/DL
MCHC RBC-ENTMCNC: 31.7 PG
MCHC RBC-ENTMCNC: 33.2 G/DL
MCV RBC AUTO: 95.5 FL
NONHDLC SERPL-MCNC: 137 MG/DL
PLATELET # BLD AUTO: 243 K/UL
POTASSIUM SERPL-SCNC: 5 MMOL/L
PROT SERPL-MCNC: 7.3 G/DL
PSA SERPL-MCNC: 2.77 NG/ML
RBC # BLD: 4.64 M/UL
RBC # FLD: 13.2 %
SODIUM SERPL-SCNC: 138 MMOL/L
TRIGL SERPL-MCNC: 216 MG/DL
WBC # FLD AUTO: 5.35 K/UL

## 2025-09-03 ENCOUNTER — RX RENEWAL (OUTPATIENT)
Age: 70
End: 2025-09-03

## 2025-09-08 ENCOUNTER — NON-APPOINTMENT (OUTPATIENT)
Age: 70
End: 2025-09-08

## 2025-09-09 ENCOUNTER — NON-APPOINTMENT (OUTPATIENT)
Age: 70
End: 2025-09-09

## 2025-09-19 ENCOUNTER — NON-APPOINTMENT (OUTPATIENT)
Age: 70
End: 2025-09-19

## (undated) DEVICE — POLY TRAP ETRAP

## (undated) DEVICE — ENDOCUFF VISION SZ 2 LG GRN

## (undated) DEVICE — SNARE EXACTO COLD 9MMX230CM

## (undated) DEVICE — TUBING CAP SET ERBEFLO CLEVERCAP HYBRID CO2 FOR OLYMPUS SCOPES AND UCR

## (undated) DEVICE — SOL IRR POUR H2O 1000ML

## (undated) DEVICE — KIT ENDO PROCEDURE CUST W/VLV

## (undated) DEVICE — FORCEP RADIAL JAW 4 240CM DISP

## (undated) DEVICE — CONTAINER FORMALIN BUFF 10% 60ML

## (undated) DEVICE — SNARE POLYP SENS SM 13MM 240CM

## (undated) DEVICE — PLATE NESSY 170